# Patient Record
Sex: FEMALE | Race: OTHER | HISPANIC OR LATINO | URBAN - METROPOLITAN AREA
[De-identification: names, ages, dates, MRNs, and addresses within clinical notes are randomized per-mention and may not be internally consistent; named-entity substitution may affect disease eponyms.]

---

## 2023-09-26 ENCOUNTER — INPATIENT (INPATIENT)
Facility: HOSPITAL | Age: 88
LOS: 1 days | Discharge: HOME | DRG: 391 | End: 2023-09-28
Attending: INTERNAL MEDICINE | Admitting: INTERNAL MEDICINE
Payer: COMMERCIAL

## 2023-09-26 VITALS
RESPIRATION RATE: 17 BRPM | WEIGHT: 160.06 LBS | SYSTOLIC BLOOD PRESSURE: 82 MMHG | HEART RATE: 93 BPM | TEMPERATURE: 97 F | HEIGHT: 62 IN | OXYGEN SATURATION: 98 % | DIASTOLIC BLOOD PRESSURE: 52 MMHG

## 2023-09-26 LAB
ALBUMIN SERPL ELPH-MCNC: 3.5 G/DL — SIGNIFICANT CHANGE UP (ref 3.3–5)
ALP SERPL-CCNC: 116 U/L — SIGNIFICANT CHANGE UP (ref 40–120)
ALT FLD-CCNC: 39 U/L — SIGNIFICANT CHANGE UP (ref 12–78)
ANION GAP SERPL CALC-SCNC: 10 MMOL/L — SIGNIFICANT CHANGE UP (ref 5–17)
AST SERPL-CCNC: 16 U/L — SIGNIFICANT CHANGE UP (ref 15–37)
BASOPHILS # BLD AUTO: 0.07 K/UL — SIGNIFICANT CHANGE UP (ref 0–0.2)
BASOPHILS NFR BLD AUTO: 0.6 % — SIGNIFICANT CHANGE UP (ref 0–2)
BILIRUB SERPL-MCNC: 0.3 MG/DL — SIGNIFICANT CHANGE UP (ref 0.2–1.2)
BUN SERPL-MCNC: 33 MG/DL — HIGH (ref 7–23)
CALCIUM SERPL-MCNC: 9.3 MG/DL — SIGNIFICANT CHANGE UP (ref 8.5–10.1)
CHLORIDE SERPL-SCNC: 97 MMOL/L — SIGNIFICANT CHANGE UP (ref 96–108)
CO2 SERPL-SCNC: 25 MMOL/L — SIGNIFICANT CHANGE UP (ref 22–31)
CREAT SERPL-MCNC: 2.7 MG/DL — HIGH (ref 0.5–1.3)
EGFR: 17 ML/MIN/1.73M2 — LOW
EOSINOPHIL # BLD AUTO: 0.06 K/UL — SIGNIFICANT CHANGE UP (ref 0–0.5)
EOSINOPHIL NFR BLD AUTO: 0.5 % — SIGNIFICANT CHANGE UP (ref 0–6)
GLUCOSE SERPL-MCNC: 428 MG/DL — HIGH (ref 70–99)
HCT VFR BLD CALC: 38.7 % — SIGNIFICANT CHANGE UP (ref 34.5–45)
HGB BLD-MCNC: 13 G/DL — SIGNIFICANT CHANGE UP (ref 11.5–15.5)
IMM GRANULOCYTES NFR BLD AUTO: 0.8 % — SIGNIFICANT CHANGE UP (ref 0–0.9)
LIDOCAIN IGE QN: 57 U/L — SIGNIFICANT CHANGE UP (ref 13–75)
LYMPHOCYTES # BLD AUTO: 0.82 K/UL — LOW (ref 1–3.3)
LYMPHOCYTES # BLD AUTO: 7.2 % — LOW (ref 13–44)
MCHC RBC-ENTMCNC: 29.7 PG — SIGNIFICANT CHANGE UP (ref 27–34)
MCHC RBC-ENTMCNC: 33.6 GM/DL — SIGNIFICANT CHANGE UP (ref 32–36)
MCV RBC AUTO: 88.4 FL — SIGNIFICANT CHANGE UP (ref 80–100)
MONOCYTES # BLD AUTO: 0.56 K/UL — SIGNIFICANT CHANGE UP (ref 0–0.9)
MONOCYTES NFR BLD AUTO: 4.9 % — SIGNIFICANT CHANGE UP (ref 2–14)
NEUTROPHILS # BLD AUTO: 9.85 K/UL — HIGH (ref 1.8–7.4)
NEUTROPHILS NFR BLD AUTO: 86 % — HIGH (ref 43–77)
NRBC # BLD: 0 /100 WBCS — SIGNIFICANT CHANGE UP (ref 0–0)
PLATELET # BLD AUTO: 284 K/UL — SIGNIFICANT CHANGE UP (ref 150–400)
POTASSIUM SERPL-MCNC: 5.4 MMOL/L — HIGH (ref 3.5–5.3)
POTASSIUM SERPL-SCNC: 5.4 MMOL/L — HIGH (ref 3.5–5.3)
PROT SERPL-MCNC: 7.5 G/DL — SIGNIFICANT CHANGE UP (ref 6–8.3)
RBC # BLD: 4.38 M/UL — SIGNIFICANT CHANGE UP (ref 3.8–5.2)
RBC # FLD: 12.9 % — SIGNIFICANT CHANGE UP (ref 10.3–14.5)
SODIUM SERPL-SCNC: 132 MMOL/L — LOW (ref 135–145)
WBC # BLD: 11.45 K/UL — HIGH (ref 3.8–10.5)
WBC # FLD AUTO: 11.45 K/UL — HIGH (ref 3.8–10.5)

## 2023-09-26 PROCEDURE — 99285 EMERGENCY DEPT VISIT HI MDM: CPT

## 2023-09-26 RX ORDER — ONDANSETRON 8 MG/1
4 TABLET, FILM COATED ORAL ONCE
Refills: 0 | Status: COMPLETED | OUTPATIENT
Start: 2023-09-26 | End: 2023-09-26

## 2023-09-26 RX ORDER — MORPHINE SULFATE 50 MG/1
4 CAPSULE, EXTENDED RELEASE ORAL ONCE
Refills: 0 | Status: DISCONTINUED | OUTPATIENT
Start: 2023-09-26 | End: 2023-09-26

## 2023-09-26 RX ORDER — SODIUM CHLORIDE 9 MG/ML
1000 INJECTION INTRAMUSCULAR; INTRAVENOUS; SUBCUTANEOUS ONCE
Refills: 0 | Status: COMPLETED | OUTPATIENT
Start: 2023-09-26 | End: 2023-09-26

## 2023-09-26 RX ADMIN — SODIUM CHLORIDE 1000 MILLILITER(S): 9 INJECTION INTRAMUSCULAR; INTRAVENOUS; SUBCUTANEOUS at 23:36

## 2023-09-26 RX ADMIN — MORPHINE SULFATE 4 MILLIGRAM(S): 50 CAPSULE, EXTENDED RELEASE ORAL at 23:36

## 2023-09-26 RX ADMIN — ONDANSETRON 4 MILLIGRAM(S): 8 TABLET, FILM COATED ORAL at 23:36

## 2023-09-26 NOTE — ED ADULT NURSE NOTE - CHPI ED NUR SYMPTOMS NEG
no abdominal distension/no blood in stool/no burning urination/no chills/no diarrhea/no dysuria/no fever/no hematuria Topical Ketoconazole Counseling: Patient counseled that this medication may cause skin irritation or allergic reactions.  In the event of skin irritation, the patient was advised to reduce the amount of the drug applied or use it less frequently.   The patient verbalized understanding of the proper use and possible adverse effects of ketoconazole.  All of the patient's questions and concerns were addressed.

## 2023-09-26 NOTE — ED ADULT NURSE NOTE - OBJECTIVE STATEMENT
Son stated she began having abdomen pain 8/10 generalized  with yellow vomitus that started yesterday. No diarrhea, LBM was 2 hours prior to arrival. Voiding ok. Had similar pain in past with abdominal surgery, but she is unable to recall what type of surgery. Denies fever, or chills. Complaining of dizziness with mobility.

## 2023-09-26 NOTE — ED ADULT NURSE NOTE - NSFALLUNIVINTERV_ED_ALL_ED
Bed/Stretcher in lowest position, wheels locked, appropriate side rails in place/Call bell, personal items and telephone in reach/Instruct patient to call for assistance before getting out of bed/chair/stretcher/Non-slip footwear applied when patient is off stretcher/[place] to call system/Physically safe environment - no spills, clutter or unnecessary equipment/Purposeful proactive rounding/Room/bathroom lighting operational, light cord in reach

## 2023-09-26 NOTE — ED ADULT TRIAGE NOTE - CHIEF COMPLAINT QUOTE
generalized abdominal pain with nausea and vomiting since yesterday. weakness and dizzy after vomiting yesterday

## 2023-09-27 DIAGNOSIS — N17.9 ACUTE KIDNEY FAILURE, UNSPECIFIED: ICD-10-CM

## 2023-09-27 DIAGNOSIS — I10 ESSENTIAL (PRIMARY) HYPERTENSION: ICD-10-CM

## 2023-09-27 DIAGNOSIS — R74.01 ELEVATION OF LEVELS OF LIVER TRANSAMINASE LEVELS: ICD-10-CM

## 2023-09-27 DIAGNOSIS — R10.9 UNSPECIFIED ABDOMINAL PAIN: ICD-10-CM

## 2023-09-27 DIAGNOSIS — E11.9 TYPE 2 DIABETES MELLITUS WITHOUT COMPLICATIONS: ICD-10-CM

## 2023-09-27 DIAGNOSIS — K52.9 NONINFECTIVE GASTROENTERITIS AND COLITIS, UNSPECIFIED: ICD-10-CM

## 2023-09-27 DIAGNOSIS — Z90.49 ACQUIRED ABSENCE OF OTHER SPECIFIED PARTS OF DIGESTIVE TRACT: Chronic | ICD-10-CM

## 2023-09-27 LAB
A1C WITH ESTIMATED AVERAGE GLUCOSE RESULT: 10.8 % — HIGH (ref 4–5.6)
ALBUMIN SERPL ELPH-MCNC: 2.9 G/DL — LOW (ref 3.3–5)
ALP SERPL-CCNC: 123 U/L — HIGH (ref 40–120)
ALT FLD-CCNC: 210 U/L — HIGH (ref 12–78)
ANION GAP SERPL CALC-SCNC: 10 MMOL/L — SIGNIFICANT CHANGE UP (ref 5–17)
APTT BLD: 19.6 SEC — LOW (ref 24.5–35.6)
AST SERPL-CCNC: 476 U/L — HIGH (ref 15–37)
BILIRUB SERPL-MCNC: 0.5 MG/DL — SIGNIFICANT CHANGE UP (ref 0.2–1.2)
BUN SERPL-MCNC: 29 MG/DL — HIGH (ref 7–23)
CALCIUM SERPL-MCNC: 8.1 MG/DL — LOW (ref 8.5–10.1)
CHLORIDE SERPL-SCNC: 105 MMOL/L — SIGNIFICANT CHANGE UP (ref 96–108)
CO2 SERPL-SCNC: 21 MMOL/L — LOW (ref 22–31)
CREAT SERPL-MCNC: 1.9 MG/DL — HIGH (ref 0.5–1.3)
EGFR: 25 ML/MIN/1.73M2 — LOW
ESTIMATED AVERAGE GLUCOSE: 263 MG/DL — HIGH (ref 68–114)
GLUCOSE BLDC GLUCOMTR-MCNC: 149 MG/DL — HIGH (ref 70–99)
GLUCOSE BLDC GLUCOMTR-MCNC: 205 MG/DL — HIGH (ref 70–99)
GLUCOSE BLDC GLUCOMTR-MCNC: 214 MG/DL — HIGH (ref 70–99)
GLUCOSE BLDC GLUCOMTR-MCNC: 218 MG/DL — HIGH (ref 70–99)
GLUCOSE SERPL-MCNC: 260 MG/DL — HIGH (ref 70–99)
HCT VFR BLD CALC: 31 % — LOW (ref 34.5–45)
HGB BLD-MCNC: 10.2 G/DL — LOW (ref 11.5–15.5)
INR BLD: 0.89 RATIO — SIGNIFICANT CHANGE UP (ref 0.85–1.18)
LACTATE SERPL-SCNC: 2.4 MMOL/L — HIGH (ref 0.7–2)
LACTATE SERPL-SCNC: 2.7 MMOL/L — HIGH (ref 0.7–2)
LACTATE SERPL-SCNC: 3.1 MMOL/L — HIGH (ref 0.7–2)
LACTATE SERPL-SCNC: 3.6 MMOL/L — HIGH (ref 0.7–2)
MAGNESIUM SERPL-MCNC: 2.2 MG/DL — SIGNIFICANT CHANGE UP (ref 1.6–2.6)
MCHC RBC-ENTMCNC: 29.6 PG — SIGNIFICANT CHANGE UP (ref 27–34)
MCHC RBC-ENTMCNC: 32.9 GM/DL — SIGNIFICANT CHANGE UP (ref 32–36)
MCV RBC AUTO: 89.9 FL — SIGNIFICANT CHANGE UP (ref 80–100)
NRBC # BLD: 0 /100 WBCS — SIGNIFICANT CHANGE UP (ref 0–0)
PLATELET # BLD AUTO: 234 K/UL — SIGNIFICANT CHANGE UP (ref 150–400)
POTASSIUM SERPL-MCNC: 4.5 MMOL/L — SIGNIFICANT CHANGE UP (ref 3.5–5.3)
POTASSIUM SERPL-SCNC: 4.5 MMOL/L — SIGNIFICANT CHANGE UP (ref 3.5–5.3)
PROT SERPL-MCNC: 6.7 G/DL — SIGNIFICANT CHANGE UP (ref 6–8.3)
PROTHROM AB SERPL-ACNC: 10.4 SEC — SIGNIFICANT CHANGE UP (ref 9.5–13)
RBC # BLD: 3.45 M/UL — LOW (ref 3.8–5.2)
RBC # FLD: 13 % — SIGNIFICANT CHANGE UP (ref 10.3–14.5)
SARS-COV-2 RNA SPEC QL NAA+PROBE: SIGNIFICANT CHANGE UP
SODIUM SERPL-SCNC: 136 MMOL/L — SIGNIFICANT CHANGE UP (ref 135–145)
WBC # BLD: 6.97 K/UL — SIGNIFICANT CHANGE UP (ref 3.8–10.5)
WBC # FLD AUTO: 6.97 K/UL — SIGNIFICANT CHANGE UP (ref 3.8–10.5)

## 2023-09-27 PROCEDURE — 93010 ELECTROCARDIOGRAM REPORT: CPT

## 2023-09-27 PROCEDURE — 74176 CT ABD & PELVIS W/O CONTRAST: CPT | Mod: 26

## 2023-09-27 PROCEDURE — 99223 1ST HOSP IP/OBS HIGH 75: CPT

## 2023-09-27 RX ORDER — DEXTROSE 50 % IN WATER 50 %
15 SYRINGE (ML) INTRAVENOUS ONCE
Refills: 0 | Status: DISCONTINUED | OUTPATIENT
Start: 2023-09-27 | End: 2023-09-28

## 2023-09-27 RX ORDER — ONDANSETRON 8 MG/1
4 TABLET, FILM COATED ORAL EVERY 6 HOURS
Refills: 0 | Status: DISCONTINUED | OUTPATIENT
Start: 2023-09-27 | End: 2023-09-28

## 2023-09-27 RX ORDER — SODIUM CHLORIDE 9 MG/ML
1000 INJECTION, SOLUTION INTRAVENOUS
Refills: 0 | Status: DISCONTINUED | OUTPATIENT
Start: 2023-09-27 | End: 2023-09-28

## 2023-09-27 RX ORDER — INSULIN LISPRO 100/ML
VIAL (ML) SUBCUTANEOUS AT BEDTIME
Refills: 0 | Status: DISCONTINUED | OUTPATIENT
Start: 2023-09-27 | End: 2023-09-27

## 2023-09-27 RX ORDER — DEXTROSE 50 % IN WATER 50 %
25 SYRINGE (ML) INTRAVENOUS ONCE
Refills: 0 | Status: DISCONTINUED | OUTPATIENT
Start: 2023-09-27 | End: 2023-09-28

## 2023-09-27 RX ORDER — HEPARIN SODIUM 5000 [USP'U]/ML
5000 INJECTION INTRAVENOUS; SUBCUTANEOUS EVERY 12 HOURS
Refills: 0 | Status: DISCONTINUED | OUTPATIENT
Start: 2023-09-27 | End: 2023-09-28

## 2023-09-27 RX ORDER — SODIUM CHLORIDE 9 MG/ML
1000 INJECTION INTRAMUSCULAR; INTRAVENOUS; SUBCUTANEOUS ONCE
Refills: 0 | Status: COMPLETED | OUTPATIENT
Start: 2023-09-27 | End: 2023-09-27

## 2023-09-27 RX ORDER — PIPERACILLIN AND TAZOBACTAM 4; .5 G/20ML; G/20ML
3.38 INJECTION, POWDER, LYOPHILIZED, FOR SOLUTION INTRAVENOUS ONCE
Refills: 0 | Status: COMPLETED | OUTPATIENT
Start: 2023-09-27 | End: 2023-09-27

## 2023-09-27 RX ORDER — INFLUENZA VIRUS VACCINE 15; 15; 15; 15 UG/.5ML; UG/.5ML; UG/.5ML; UG/.5ML
0.7 SUSPENSION INTRAMUSCULAR ONCE
Refills: 0 | Status: DISCONTINUED | OUTPATIENT
Start: 2023-09-27 | End: 2023-09-28

## 2023-09-27 RX ORDER — INSULIN LISPRO 100/ML
VIAL (ML) SUBCUTANEOUS
Refills: 0 | Status: DISCONTINUED | OUTPATIENT
Start: 2023-09-27 | End: 2023-09-28

## 2023-09-27 RX ORDER — INSULIN LISPRO 100/ML
VIAL (ML) SUBCUTANEOUS
Refills: 0 | Status: DISCONTINUED | OUTPATIENT
Start: 2023-09-27 | End: 2023-09-27

## 2023-09-27 RX ORDER — LANOLIN ALCOHOL/MO/W.PET/CERES
3 CREAM (GRAM) TOPICAL AT BEDTIME
Refills: 0 | Status: DISCONTINUED | OUTPATIENT
Start: 2023-09-27 | End: 2023-09-28

## 2023-09-27 RX ORDER — SODIUM CHLORIDE 9 MG/ML
1000 INJECTION INTRAMUSCULAR; INTRAVENOUS; SUBCUTANEOUS
Refills: 0 | Status: DISCONTINUED | OUTPATIENT
Start: 2023-09-27 | End: 2023-09-28

## 2023-09-27 RX ORDER — GLUCAGON INJECTION, SOLUTION 0.5 MG/.1ML
1 INJECTION, SOLUTION SUBCUTANEOUS ONCE
Refills: 0 | Status: DISCONTINUED | OUTPATIENT
Start: 2023-09-27 | End: 2023-09-28

## 2023-09-27 RX ORDER — DEXTROSE 50 % IN WATER 50 %
12.5 SYRINGE (ML) INTRAVENOUS ONCE
Refills: 0 | Status: DISCONTINUED | OUTPATIENT
Start: 2023-09-27 | End: 2023-09-28

## 2023-09-27 RX ORDER — INSULIN LISPRO 100/ML
VIAL (ML) SUBCUTANEOUS AT BEDTIME
Refills: 0 | Status: DISCONTINUED | OUTPATIENT
Start: 2023-09-27 | End: 2023-09-28

## 2023-09-27 RX ORDER — ACETAMINOPHEN 500 MG
650 TABLET ORAL EVERY 6 HOURS
Refills: 0 | Status: DISCONTINUED | OUTPATIENT
Start: 2023-09-27 | End: 2023-09-28

## 2023-09-27 RX ADMIN — SODIUM CHLORIDE 1000 MILLILITER(S): 9 INJECTION INTRAMUSCULAR; INTRAVENOUS; SUBCUTANEOUS at 01:12

## 2023-09-27 RX ADMIN — HEPARIN SODIUM 5000 UNIT(S): 5000 INJECTION INTRAVENOUS; SUBCUTANEOUS at 17:26

## 2023-09-27 RX ADMIN — Medication 2: at 11:46

## 2023-09-27 RX ADMIN — Medication 2: at 07:54

## 2023-09-27 RX ADMIN — PIPERACILLIN AND TAZOBACTAM 200 GRAM(S): 4; .5 INJECTION, POWDER, LYOPHILIZED, FOR SOLUTION INTRAVENOUS at 01:11

## 2023-09-27 RX ADMIN — HEPARIN SODIUM 5000 UNIT(S): 5000 INJECTION INTRAVENOUS; SUBCUTANEOUS at 06:23

## 2023-09-27 RX ADMIN — SODIUM CHLORIDE 500 MILLILITER(S): 9 INJECTION INTRAMUSCULAR; INTRAVENOUS; SUBCUTANEOUS at 10:59

## 2023-09-27 RX ADMIN — SODIUM CHLORIDE 100 MILLILITER(S): 9 INJECTION INTRAMUSCULAR; INTRAVENOUS; SUBCUTANEOUS at 05:46

## 2023-09-27 NOTE — ED PROVIDER NOTE - GASTROINTESTINAL, MLM
Abdomen diffusely distended, with diffuse, non-focal tenderness.  No rebound, no guarding.  +BS, no CVA tenderness, no pulsatile mass

## 2023-09-27 NOTE — ED PROVIDER NOTE - OBJECTIVE STATEMENT
87-year-old female with a history of HLD, DM presents with abdominal pain.  Macedonian translation provided by son at the bedside.  Son reports that patient had mild generalized abdominal pain yesterday.  Today the pain significantly worsened, and is associated with 5 episodes of nonbloody vomitus.  Patient took no pain medication prior to arrival.  She reports severe constant abdominal pain that occasionally radiates to her back.  No associated diarrhea, fever, dysuria, hematuria.  History of cholecystectomy 5 years ago.  No PMD. 87-year-old female with a history of HLD, DM presents with abdominal pain.  Lao translation provided by son at the bedside.  Son reports that patient had mild generalized abdominal pain yesterday.  Today the pain significantly worsened, and is associated with 5 episodes of nonbloody vomitus.  Patient took no pain medication prior to arrival.  She reports severe constant abdominal pain that occasionally radiates to her back.  No associated diarrhea, fever, dysuria, hematuria.  History of cholecystectomy 15 years ago.  No PMD. 87-year-old female with a history of HLD, DM presents with abdominal pain.  Slovak translation provided by son at the bedside.  Son reports that patient had mild generalized abdominal pain yesterday.  Today the pain significantly worsened, and is associated with 5 episodes of nonbloody vomitus.  Patient took no pain medication prior to arrival.  She reports severe constant abdominal pain that occasionally radiates to her back.  No associated diarrhea, fever, dysuria, hematuria.  History of cholecystectomy 15 years ago.  Unknown baseline Cr. No PMD. 87-year-old female with a history of HLD, DM presents with abdominal pain.  Armenian translation provided by son at the bedside.  Son reports that patient had mild generalized abdominal pain yesterday.  Today the pain significantly worsened, and is associated with 5 episodes of nonbloody vomitus.  Patient took no pain medication prior to arrival.  She reports severe constant abdominal pain that occasionally radiates to her back.  No associated diarrhea, fever, dysuria, hematuria.  History of cholecystectomy 15 years ago.  Unknown baseline Cr. Son does not know what medication patient takes for her DM or HLD. No PMD.

## 2023-09-27 NOTE — CONSULT NOTE ADULT - SUBJECTIVE AND OBJECTIVE BOX
Patient is a 87y old  Female who presents with a chief complaint of n/v (27 Sep 2023 09:31)      Reason For Consult: dm2 uncontrolled    HPI:  86 y/o female w/ PMH DM p/w abdominal pain x2 days. History partially gathered from pt's grand daughter Afsaneh (Emergency contact) on the phone. Pt states abdominal pain started 2 days ago and was mild at outset. Pt's granddaughter notes that this occasional happens in the past and usually subsides on its own. Pt then endorses increased pain today and vomiting on herself (NBNB emesis) at least five times. Pt endorses pain was severe when she came to the ER and has improved s/p pain medication administration (~3 hours ago). At present, pt now endorses generalized body pain and headache, but less abdominal pain. No diarrhea.  Pt's granddaughter states that appointment to establish care w/ a PCP was made when pt's symptoms first started (Dr. Nadia Venegas) and was supposed to be later today. Pt is originally from [facility] and has been living with her son for the last few months. Patient hypotensive in ER - SBP 80s   (27 Sep 2023 06:06)      PAST MEDICAL & SURGICAL HISTORY:  DM (diabetes mellitus)      HLD (hyperlipidemia)      History of cholecystectomy          FAMILY HISTORY:        Social History:    MEDICATIONS  (STANDING):  dextrose 5%. 1000 milliLiter(s) (50 mL/Hr) IV Continuous <Continuous>  dextrose 5%. 1000 milliLiter(s) (100 mL/Hr) IV Continuous <Continuous>  dextrose 50% Injectable 25 Gram(s) IV Push once  dextrose 50% Injectable 25 Gram(s) IV Push once  dextrose 50% Injectable 12.5 Gram(s) IV Push once  glucagon  Injectable 1 milliGRAM(s) IntraMuscular once  heparin   Injectable 5000 Unit(s) SubCutaneous every 12 hours  insulin lispro (ADMELOG) corrective regimen sliding scale   SubCutaneous three times a day before meals  insulin lispro (ADMELOG) corrective regimen sliding scale   SubCutaneous at bedtime  sodium chloride 0.9%. 1000 milliLiter(s) (100 mL/Hr) IV Continuous <Continuous>    MEDICATIONS  (PRN):  acetaminophen     Tablet .. 650 milliGRAM(s) Oral every 6 hours PRN Temp greater or equal to 38C (100.4F), Mild Pain (1 - 3)  aluminum hydroxide/magnesium hydroxide/simethicone Suspension 30 milliLiter(s) Oral every 4 hours PRN Dyspepsia  dextrose Oral Gel 15 Gram(s) Oral once PRN Blood Glucose LESS THAN 70 milliGRAM(s)/deciliter  melatonin 3 milliGRAM(s) Oral at bedtime PRN Insomnia  ondansetron Injectable 4 milliGRAM(s) IV Push every 6 hours PRN Nausea and/or Vomiting        T(C): 36.9 (09-27-23 @ 06:44), Max: 36.9 (09-27-23 @ 06:44)  HR: 79 (09-27-23 @ 06:44) (79 - 93)  BP: 123/68 (09-27-23 @ 06:44) (82/52 - 123/68)  RR: 17 (09-27-23 @ 06:44) (16 - 17)  SpO2: 93% (09-27-23 @ 06:44) (93% - 98%)  Wt(kg): --    PHYSICAL EXAM:  CHEST/LUNG: Clear to percussion bilaterally; No rales, rhonchi, wheezing, or rubs  HEART: Regular rate and rhythm; No murmurs, rubs, or gallops  ABDOMEN: Soft, Nontender, Nondistended; Bowel sounds present  EXTREMITIES:  2+ Peripheral Pulses, No clubbing, cyanosis, or edema  SKIN: No rashes or lesions    CAPILLARY BLOOD GLUCOSE      POCT Blood Glucose.: 218 mg/dL (27 Sep 2023 07:50)                            10.2   6.97  )-----------( 234      ( 27 Sep 2023 09:35 )             31.0       CMP:  09-27 @ 09:35  SGPT 210  Albumin 2.9   Alk Phos 123   Anion Gap 10   SGOT 476   Total Bili 0.5   BUN 29   Calcium Total 8.1   CO2 21   Chloride 105   Creatinine 1.90   eGFR if AA --   eGFR if non AA --   Glucose 260   Potassium 4.5   Protein 6.7   Sodium 136      Thyroid Function Tests:      Diabetes Tests:       Radiology:

## 2023-09-27 NOTE — CONSULT NOTE ADULT - SUBJECTIVE AND OBJECTIVE BOX
[facility]  Adi Pino PA-C  [address] [place] [address] [id number] [phone]      Chief Complaint:  Patient is a 87y old  Female who presents with a chief complaint of n/v (27 Sep 2023 11:37)      HPI:86 y/o female w/ PMH DM p/w abdominal pain x2 days. History partially gathered from pt's grand daughter Afsaneh (Emergency contact) on the phone. Pt states abdominal pain started 2 days ago and was mild at outset. Pt's granddaughter notes that this occasional happens in the past and usually subsides on its own. Pt then endorses increased pain today and vomiting on herself (NBNB emesis) at least five times. Pt endorses pain was severe when she came to the ER and has improved s/p pain medication administration (~3 hours ago). At present, pt now endorses generalized body pain and headache, but less abdominal pain. No diarrhea.      Allergies:  No Known Allergies      Medications:  acetaminophen     Tablet .. 650 milliGRAM(s) Oral every 6 hours PRN  aluminum hydroxide/magnesium hydroxide/simethicone Suspension 30 milliLiter(s) Oral every 4 hours PRN  dextrose 5%. 1000 milliLiter(s) IV Continuous <Continuous>  dextrose 5%. 1000 milliLiter(s) IV Continuous <Continuous>  dextrose 50% Injectable 25 Gram(s) IV Push once  dextrose 50% Injectable 25 Gram(s) IV Push once  dextrose 50% Injectable 12.5 Gram(s) IV Push once  dextrose Oral Gel 15 Gram(s) Oral once PRN  glucagon  Injectable 1 milliGRAM(s) IntraMuscular once  heparin   Injectable 5000 Unit(s) SubCutaneous every 12 hours  influenza  Vaccine (HIGH DOSE) 0.7 milliLiter(s) IntraMuscular once  insulin lispro (ADMELOG) corrective regimen sliding scale   SubCutaneous at bedtime  insulin lispro (ADMELOG) corrective regimen sliding scale   SubCutaneous three times a day before meals  melatonin 3 milliGRAM(s) Oral at bedtime PRN  ondansetron Injectable 4 milliGRAM(s) IV Push every 6 hours PRN  sodium chloride 0.9%. 1000 milliLiter(s) IV Continuous <Continuous>      PMHX/PSHX:  DM (diabetes mellitus)    HLD (hyperlipidemia)    History of cholecystectomy        Family history:      Social History:     ROS:     General:  no fevers, chills, night sweats, fatigue,   Eyes:  Good vision, no reported pain  ENT:  No sore throat, pain, runny nose, dysphagia  CV:  No pain, palpitations, hypo/hypertension  Resp:  No dyspnea, cough, tachypnea, wheezing  GI:  + pain, No nausea, No vomiting, No diarrhea, No constipation, No weight loss, No fever, No pruritis, No rectal bleeding, No tarry stools, No dysphagia,  :  No pain, bleeding, incontinence, nocturia  Muscle:  No pain, weakness  Neuro:  No weakness, tingling, memory problems  Psych:  No fatigue, insomnia, mood problems, depression  Endocrine:  No polyuria, polydipsia, cold/heat intolerance  Heme:  No petechiae, ecchymosis, easy bruisability  Skin:  No rash, tattoos, scars, edema      PHYSICAL EXAM:   Vital Signs:  Vital Signs Last 24 Hrs  T(C): 36.8 (27 Sep 2023 11:30), Max: 36.9 (27 Sep 2023 06:44)  T(F): 98.3 (27 Sep 2023 11:30), Max: 98.4 (27 Sep 2023 06:44)  HR: 70 (27 Sep 2023 11:30) (70 - 93)  BP: 110/67 (27 Sep 2023 11:30) (82/52 - 123/68)  BP(mean): --  RR: 19 (27 Sep 2023 11:30) (16 - 19)  SpO2: 98% (27 Sep 2023 11:30) (93% - 98%)    Parameters below as of 27 Sep 2023 11:30  Patient On (Oxygen Delivery Method): room air      Daily Height in cm: 157.48 (26 Sep 2023 21:02)    Daily     GENERAL:  Appears stated age,   HEENT:  NC/AT,    CHEST:  Full & symmetric excursion,   HEART:  Regular rhythm  ABDOMEN:  Soft, non-tender, non-distended,   EXTEREMITIES:  no cyanosis,clubbing or edema  SKIN:  No rash  NEURO:  Alert,    LABS:                        10.2   6.97  )-----------( 234      ( 27 Sep 2023 09:35 )             31.0     09-27    136  |  105  |  29<H>  ----------------------------<  260<H>  4.5   |  21<L>  |  1.90<H>    Ca    8.1<L>      27 Sep 2023 09:35  Mg     2.2     09-27    TPro  6.7  /  Alb  2.9<L>  /  TBili  0.5  /  DBili  x   /  AST  476<H>  /  ALT  210<H>  /  AlkPhos  123<H>  09-27    LIVER FUNCTIONS - ( 27 Sep 2023 09:35 )  Alb: 2.9 g/dL / Pro: 6.7 g/dL / ALK PHOS: 123 U/L / ALT: 210 U/L / AST: 476 U/L / GGT: x           PT/INR - ( 26 Sep 2023 23:26 )   PT: 10.4 sec;   INR: 0.89 ratio         PTT - ( 26 Sep 2023 23:26 )  PTT:19.6 sec  Urinalysis Basic - ( 27 Sep 2023 09:35 )    Color: x / Appearance: x / SG: x / pH: x  Gluc: 260 mg/dL / Ketone: x  / Bili: x / Urobili: x   Blood: x / Protein: x / Nitrite: x   Leuk Esterase: x / RBC: x / WBC x   Sq Epi: x / Non Sq Epi: x / Bacteria: x      Amylase Serum--      Lipase serum57       Ammonia--      Imaging:

## 2023-09-27 NOTE — PATIENT PROFILE ADULT - FALL HARM RISK - HARM RISK INTERVENTIONS
Assistance with ambulation/Assistance OOB with selected safe patient handling equipment/Communicate Risk of Fall with Harm to all staff/Discuss with provider need for PT consult/Monitor gait and stability/Provide patient with walking aids - walker, cane, crutches/Reinforce activity limits and safety measures with patient and family/Sit up slowly, dangle for a short time, stand at bedside before walking/Tailored Fall Risk Interventions/Visual Cue: Yellow wristband and red socks/Bed in lowest position, wheels locked, appropriate side rails in place/Call bell, personal items and telephone in reach/Instruct patient to call for assistance before getting out of bed or chair/Non-slip footwear when patient is out of bed/[place] to call system/Physically safe environment - no spills, clutter or unnecessary equipment/Purposeful Proactive Rounding/Room/bathroom lighting operational, light cord in reach

## 2023-09-27 NOTE — H&P ADULT - PROBLEM SELECTOR PLAN 1
Admit  Clear liquid diet, advance as tolerated  Zofran prn  IV PPI qd  Trend labs  Surgery f/u  GI consult  Further work-up/management pending clinical course. Admit  Clear liquid diet, advance as tolerated  Stool cultures if patient develops diarrhea  Zofran prn  IV PPI qd  Trend labs  Surgery f/u  GI consult  Further work-up/management pending clinical course.

## 2023-09-27 NOTE — CONSULT NOTE ADULT - SUBJECTIVE AND OBJECTIVE BOX
Patient is a 87y old  Female who presents with a chief complaint of n/v (27 Sep 2023 06:06)    HPI:  88 y/o female w/ PMH DM p/w abdominal pain x2 days. History partially gathered from pt's grand daughter Afsaneh (Emergency contact) on the phone. Pt states abdominal pain started 2 days ago and was mild at outset. Pt's granddaughter notes that this occasional happens in the past and usually subsides on its own. Pt then endorses increased pain today and vomiting on herself (NBNB emesis) at least five times. Pt endorses pain was severe when she came to the ER and has improved s/p pain medication administration (~3 hours ago). At present, pt now endorses generalized body pain and headache, but less abdominal pain.   Pt's granddaughter states that appointment to establish care w/ a PCP was made when pt's symptoms first started (Dr. Nadia Venegas) and was supposed to be later today. Pt is originally from [facility] and has been living with her son for the last few months.   (27 Sep 2023 06:06)      PAST MEDICAL HISTORY:  DM (diabetes mellitus)    HLD (hyperlipidemia)        PAST SURGICAL HISTORY:  History of cholecystectomy        FAMILY HISTORY:      SOCIAL HISTORY:    Allergies    No Known Allergies    Intolerances      Home Medications:    MEDICATIONS  (STANDING):  dextrose 5%. 1000 milliLiter(s) (50 mL/Hr) IV Continuous <Continuous>  dextrose 5%. 1000 milliLiter(s) (100 mL/Hr) IV Continuous <Continuous>  dextrose 50% Injectable 25 Gram(s) IV Push once  dextrose 50% Injectable 25 Gram(s) IV Push once  dextrose 50% Injectable 12.5 Gram(s) IV Push once  glucagon  Injectable 1 milliGRAM(s) IntraMuscular once  heparin   Injectable 5000 Unit(s) SubCutaneous every 12 hours  insulin lispro (ADMELOG) corrective regimen sliding scale   SubCutaneous three times a day before meals  insulin lispro (ADMELOG) corrective regimen sliding scale   SubCutaneous at bedtime  sodium chloride 0.9%. 1000 milliLiter(s) (100 mL/Hr) IV Continuous <Continuous>    MEDICATIONS  (PRN):  acetaminophen     Tablet .. 650 milliGRAM(s) Oral every 6 hours PRN Temp greater or equal to 38C (100.4F), Mild Pain (1 - 3)  aluminum hydroxide/magnesium hydroxide/simethicone Suspension 30 milliLiter(s) Oral every 4 hours PRN Dyspepsia  dextrose Oral Gel 15 Gram(s) Oral once PRN Blood Glucose LESS THAN 70 milliGRAM(s)/deciliter  melatonin 3 milliGRAM(s) Oral at bedtime PRN Insomnia  ondansetron Injectable 4 milliGRAM(s) IV Push every 6 hours PRN Nausea and/or Vomiting      REVIEW OF SYSTEMS:  General:   Respiratory: No cough, SOB  Cardiovascular: No CP or Palpitations	  Gastrointestinal: No nausea, Vomiting. No diarrhea  Genitourinary: No urinary complaints	  Musculoskeletal: No leg swelling, No new rash or lesions	  Neurological: 	  all other systems negative    T(F): 98.4 (09-27-23 @ 06:44), Max: 98.4 (09-27-23 @ 06:44)  HR: 79 (09-27-23 @ 06:44) (79 - 93)  BP: 123/68 (09-27-23 @ 06:44) (82/52 - 123/68)  RR: 17 (09-27-23 @ 06:44) (16 - 17)  SpO2: 93% (09-27-23 @ 06:44) (93% - 98%)  Wt(kg): --    PHYSICAL EXAM:  General: NAD  Respiratory: b/l air entry  Cardiovascular: S1 S2  Gastrointestinal: soft  Extremities: edema        09-26    132<L>  |  97  |  33<H>  ----------------------------<  428<H>  5.4<H>   |  25  |  2.70<H>    Ca    9.3      26 Sep 2023 23:26    TPro  7.5  /  Alb  3.5  /  TBili  0.3  /  DBili  x   /  AST  16  /  ALT  39  /  AlkPhos  116  09-26                          13.0   11.45 )-----------( 284      ( 26 Sep 2023 23:26 )             38.7       Calcium: 9.3 mg/dL (09-26 @ 23:26)  Blood Urea Nitrogen: 33 mg/dL (09-26 @ 23:26)  Potassium: 5.4 mmol/L (09-26 @ 23:26)  Hemoglobin: 13.0 g/dL (09-26 @ 23:26)      Creatinine, Serum: 2.70 (09-26 @ 23:26)      Urinalysis Basic - ( 26 Sep 2023 23:26 )    Color: x / Appearance: x / SG: x / pH: x  Gluc: 428 mg/dL / Ketone: x  / Bili: x / Urobili: x   Blood: x / Protein: x / Nitrite: x   Leuk Esterase: x / RBC: x / WBC x   Sq Epi: x / Non Sq Epi: x / Bacteria: x      LIVER FUNCTIONS - ( 26 Sep 2023 23:26 )  Alb: 3.5 g/dL / Pro: 7.5 g/dL / ALK PHOS: 116 U/L / ALT: 39 U/L / AST: 16 U/L / GGT: x                       I&O's Detail           Patient is a 87y old  Female who presents with a chief complaint of n/v (27 Sep 2023 06:06)    HPI:  86 y/o female w/ PMH DM p/w abdominal pain x2 days. History partially gathered from pt's grand daughter Afsaneh (Emergency contact) on the phone. Pt states abdominal pain started 2 days ago and was mild at outset. Pt's granddaughter notes that this occasional happens in the past and usually subsides on its own. Pt then endorses increased pain today and vomiting on herself (NBNB emesis) at least five times. Pt endorses pain was severe when she came to the ER and has improved s/p pain medication administration (~3 hours ago). At present, pt now endorses generalized body pain and headache, but less abdominal pain.   Pt's granddaughter states that appointment to establish care w/ a PCP was made when pt's symptoms first started (Dr. Nadia Venegas) and was supposed to be later today. Pt is originally from [facility] and has been living with her son for the last few months.  (27 Sep 2023 06:06)    Renal consult called for RAQUEL. History obtained from chart.       PAST MEDICAL HISTORY:  DM (diabetes mellitus)    HLD (hyperlipidemia)        PAST SURGICAL HISTORY:  History of cholecystectomy        FAMILY HISTORY:      SOCIAL HISTORY:    Allergies    No Known Allergies    Intolerances      Home Medications:    MEDICATIONS  (STANDING):  dextrose 5%. 1000 milliLiter(s) (50 mL/Hr) IV Continuous <Continuous>  dextrose 5%. 1000 milliLiter(s) (100 mL/Hr) IV Continuous <Continuous>  dextrose 50% Injectable 25 Gram(s) IV Push once  dextrose 50% Injectable 25 Gram(s) IV Push once  dextrose 50% Injectable 12.5 Gram(s) IV Push once  glucagon  Injectable 1 milliGRAM(s) IntraMuscular once  heparin   Injectable 5000 Unit(s) SubCutaneous every 12 hours  insulin lispro (ADMELOG) corrective regimen sliding scale   SubCutaneous three times a day before meals  insulin lispro (ADMELOG) corrective regimen sliding scale   SubCutaneous at bedtime  sodium chloride 0.9%. 1000 milliLiter(s) (100 mL/Hr) IV Continuous <Continuous>    MEDICATIONS  (PRN):  acetaminophen     Tablet .. 650 milliGRAM(s) Oral every 6 hours PRN Temp greater or equal to 38C (100.4F), Mild Pain (1 - 3)  aluminum hydroxide/magnesium hydroxide/simethicone Suspension 30 milliLiter(s) Oral every 4 hours PRN Dyspepsia  dextrose Oral Gel 15 Gram(s) Oral once PRN Blood Glucose LESS THAN 70 milliGRAM(s)/deciliter  melatonin 3 milliGRAM(s) Oral at bedtime PRN Insomnia  ondansetron Injectable 4 milliGRAM(s) IV Push every 6 hours PRN Nausea and/or Vomiting      REVIEW OF SYSTEMS:  General: no fever  Respiratory: No cough, SOB  Cardiovascular: No CP or Palpitations	  Gastrointestinal: + abd pain  Genitourinary: No urinary complaints	  Musculoskeletal: No new rash or lesions		  all other systems negative    T(F): 98.4 (09-27-23 @ 06:44), Max: 98.4 (09-27-23 @ 06:44)  HR: 79 (09-27-23 @ 06:44) (79 - 93)  BP: 123/68 (09-27-23 @ 06:44) (82/52 - 123/68)  RR: 17 (09-27-23 @ 06:44) (16 - 17)  SpO2: 93% (09-27-23 @ 06:44) (93% - 98%)  Wt(kg): --    PHYSICAL EXAM:  General: NAD  Respiratory: b/l air entry  Cardiovascular: S1 S2  Gastrointestinal: soft, + tenderness  Extremities: no edema        09-26    132<L>  |  97  |  33<H>  ----------------------------<  428<H>  5.4<H>   |  25  |  2.70<H>    Ca    9.3      26 Sep 2023 23:26    TPro  7.5  /  Alb  3.5  /  TBili  0.3  /  DBili  x   /  AST  16  /  ALT  39  /  AlkPhos  116  09-26                          13.0   11.45 )-----------( 284      ( 26 Sep 2023 23:26 )             38.7       Calcium: 9.3 mg/dL (09-26 @ 23:26)  Blood Urea Nitrogen: 33 mg/dL (09-26 @ 23:26)  Potassium: 5.4 mmol/L (09-26 @ 23:26)  Hemoglobin: 13.0 g/dL (09-26 @ 23:26)      Creatinine, Serum: 2.70 (09-26 @ 23:26)      Urinalysis Basic - ( 26 Sep 2023 23:26 )    Color: x / Appearance: x / SG: x / pH: x  Gluc: 428 mg/dL / Ketone: x  / Bili: x / Urobili: x   Blood: x / Protein: x / Nitrite: x   Leuk Esterase: x / RBC: x / WBC x   Sq Epi: x / Non Sq Epi: x / Bacteria: x      LIVER FUNCTIONS - ( 26 Sep 2023 23:26 )  Alb: 3.5 g/dL / Pro: 7.5 g/dL / ALK PHOS: 116 U/L / ALT: 39 U/L / AST: 16 U/L / GGT: x                 < from: CT Abdomen and Pelvis No Cont (09.27.23 @ 00:34) >    ACC: [id number] EXAM:  CT ABDOMEN AND PELVIS   ORDERED BY: ATIF PEDRAZA     PROCEDURE DATE:  09/27/2023          INTERPRETATION:  CLINICAL INFORMATION: 87 years old. Female. diffuse   abdominal pain with vomiting.    COMPARISON: None.    CONTRAST/COMPLICATIONS:  IV Contrast: NONE  Oral Contrast: NONE  Complications: None reported at the time of study completion    PROCEDURE:  CT of the Abdomen and Pelvis was performed.  Sagittal and coronal reformats were performed.    FINDINGS:  LOWER CHEST: Moderate hiatal hernia.    LIVER: Within normal limits.  BILE DUCTS: Normal caliber.  GALLBLADDER: Cholecystectomy.  SPLEEN: Within normal limits.  PANCREAS: Within normal limits.  ADRENALS: Within normal limits.  KIDNEYS/URETERS: No radiodense calculus. No hydroureteronephrosis.    BLADDER: No radiodense debris.  REPRODUCTIVE ORGANS: Uterus is unremarkable.    BOWEL: No bowel obstruction. Appendix is unremarkable. Mild nonspecific   prominence of small bowel loops, predominantly in the right lower  quadrant, probably nonspecific enteritis.  PERITONEUM: No ascites.  VESSELS: Normal caliber abdominal aorta.  RETROPERITONEUM/LYMPH NODES: No lymphadenopathy.  ABDOMINAL WALL: Within normal limits.  BONES: Within normal limits.    IMPRESSION:    Findings suggest nonspecific enteritis.    --- End of Report ---            JORGE ACUNA MD; Attending Radiologist  This document has been electronically signed. Sep 27 2023  1:18AM    < end of copied text >

## 2023-09-27 NOTE — ED PROVIDER NOTE - DIFFERENTIAL DIAGNOSIS
Ddx includes but not limited to SBO, DKA, gastroenteritis, mesenteric ischemia, ischemic colitis, pancreatitis, liver cirrhosis, CHF Differential Diagnosis

## 2023-09-27 NOTE — PHYSICAL THERAPY INITIAL EVALUATION ADULT - PERTINENT HX OF CURRENT PROBLEM, REHAB EVAL
88 y/o female w/ PMH DM p/w abdominal pain x2 days. Pt states abdominal pain started 2 days ago and was mild at outset. Pt then endorses increased pain today and vomiting on herself (NBNB emesis) at least five times. Pt endorses pain was severe when she came to the ER and has improved s/p pain medication administration (~3 hours ago). At present, pt now endorses generalized body pain and headache, but less abdominal pain. No diarrhea.

## 2023-09-27 NOTE — CONSULT NOTE ADULT - SUBJECTIVE AND OBJECTIVE BOX
SURGERY PA CONSULT NOTE:    CHIEF COMPLAINT:  Patient is a 87y old  Female who presents with a chief complaint of Abdominal pain, N/V    HPI FROM ED:  87-year-old female with a history of HLD, DM presents with abdominal pain.  Austrian translation provided by son at the bedside.  Son reports that patient had mild generalized abdominal pain yesterday.  Today the pain significantly worsened, and is associated with 5 episodes of nonbloody vomitus.  Patient took no pain medication prior to arrival.  She reports severe constant abdominal pain that occasionally radiates to her back.  No associated diarrhea, fever, dysuria, hematuria.  History of cholecystectomy 15 years ago    INTERVAL:  86 y/o female w/ PMH DM p/w abdominal pain x2 days. History partially gathered from pt's grand daughter Afsaneh (Emergency contact) on the phone. Pt states abdominal pain started 2 days ago and was mild at outset. Pt's granddaughter notes that this occasional happens in the past and usually subsides on its own. Pt then endorses increased pain today and vomiting on herself (NBNB emesis) at least five times. Pt endorses pain was severe when she came to the ER and has improved s/p pain medication administration (~3 hours ago). At present, pt now endorses generalized body pain and headache, but less abdominal pain.   Pt's granddaughter states that appointment to establish care w/ a PCP was made when pt's symptoms first started (Dr. Nadia Venegas) and was supposed to be later today. Pt is originally from [facility] and has been living with her son for the last few months.    PAST MEDICAL & SURGICAL HISTORY:  DM (diabetes mellitus)    HLD (hyperlipidemia)    History of cholecystectomy    REVIEW OF SYSTEMS:  General/Constitutional: No acute distress, no headache, weakness, fevers, or chills   Respiratory: Denies cough/hemoptysis, denies wheezing/SOB/dyspnea  Cardiac: Denies chest pain, palpitations  Abdomen: SEE HPI  Extremities: Denies sores, swelling, discoloration bilat UE/LE  Genitourinary: Denies urinary issues or complaints, denies dysuria/hematuria  Neuro: Denies weakness, paraesthesias, paralysis, syncope, loss of vision  Skin: Denies pruritus, pain, rashes  Psych: Denies hallucinations, visual disturbances, or depression    MEDICATIONS:      ALLERGIES:  No Known Allergies    SOCIAL HISTORY:  Denies alcohol, tobacco product, or elicit drug use.  Originally from [facility]. Living w/ her son for the past few months.    VITAL SIGNS:  Vital Signs Last 24 Hrs  T(C): 36.4 (27 Sep 2023 03:13), Max: 36.4 (27 Sep 2023 03:13)  T(F): 97.6 (27 Sep 2023 03:13), Max: 97.6 (27 Sep 2023 03:13)  HR: 88 (27 Sep 2023 03:13) (88 - 93)  BP: 94/60 (27 Sep 2023 03:13) (82/52 - 94/60)  BP(mean): --  RR: 16 (27 Sep 2023 03:13) (16 - 17)  SpO2: 95% (27 Sep 2023 03:13) (95% - 98%)    Parameters below as of 27 Sep 2023 03:13  Patient On (Oxygen Delivery Method): room air    PHYSICAL EXAM:  General: No acute distress, appears comfortable, well-groomed, appears stated age, elderly female  Head, Eyes, Ears, Nose, Throat: Normal cephalic/atraumatic, anicteric, conjunctiva-non injected and moist, vision grossly intact, hearing grossly intact  Abdomen: Scar from prior ccy noted in RUQ; distended but soft, mild generalized TTP; no guarding, no rebound ttp, no osvaldo peritonitic features.  Extremity: No swelling, or open sores, no gross deformities,  good range of motion  Skin: Good color, turgor, texture with no gross lesions, no eruptions, no rashes, no subcutaneous nodules and normal temperature.     LABS:                      13.0   11.45 )-----------( 284      ( 26 Sep 2023 23:26 )             38.7     09-26    132<L>  |  97  |  33<H>  ----------------------------<  428<H>  5.4<H>   |  25  |  2.70<H>    Ca    9.3      26 Sep 2023 23:26    TPro  7.5  /  Alb  3.5  /  TBili  0.3  /  DBili  x   /  AST  16  /  ALT  39  /  AlkPhos  116  09-26    PT/INR - ( 26 Sep 2023 23:26 )   PT: 10.4 sec;   INR: 0.89 ratio    PTT - ( 26 Sep 2023 23:26 )  PTT:19.6 sec  Urinalysis Basic - ( 26 Sep 2023 23:26 )    Color: x / Appearance: x / SG: x / pH: x  Gluc: 428 mg/dL / Ketone: x  / Bili: x / Urobili: x   Blood: x / Protein: x / Nitrite: x   Leuk Esterase: x / RBC: x / WBC x   Sq Epi: x / Non Sq Epi: x / Bacteria: x    LIVER FUNCTIONS - ( 26 Sep 2023 23:26 )  Alb: 3.5 g/dL / Pro: 7.5 g/dL / ALK PHOS: 116 U/L / ALT: 39 U/L / AST: 16 U/L / GGT: x           RADIOLOGY & ADDITIONAL STUDIES:  ACC: [id number] EXAM:  CT ABDOMEN AND PELVIS   ORDERED BY: ATIF PEDRAZA   PROCEDURE DATE:  09/27/2023      FINDINGS:  LOWER CHEST: Moderate hiatal hernia.    LIVER: Within normal limits.  BILE DUCTS: Normal caliber.  GALLBLADDER: Cholecystectomy.  SPLEEN: Within normal limits.  PANCREAS: Within normal limits.  ADRENALS: Within normal limits.  KIDNEYS/URETERS: No radiodense calculus. No hydroureteronephrosis.    BLADDER: No radiodense debris.  REPRODUCTIVE ORGANS: Uterus is unremarkable.    BOWEL: No bowel obstruction. Appendix is unremarkable. Mild nonspecific   prominence of small bowel loops, predominantly in the right lower  quadrant, probably nonspecific enteritis.  PERITONEUM: No ascites.  VESSELS: Normal caliber abdominal aorta.  RETROPERITONEUM/LYMPH NODES: No lymphadenopathy.  ABDOMINAL WALL: Within normal limits.  BONES: Within normal limits.    IMPRESSION:    Findings suggest nonspecific enteritis.    --- End of Report ---  JORGE ACUNA MD; Attending Radiologist    ASSESSMENT:  86 y/o female w/ PMH DM p/w abdominal pain, N/V x2 days. CTAP noncontrast reveals "mild nonspecific prominence of small bowel loops, predominantly in the right lower quadrant, probably nonspecific enteritis."  Afebrile. Hypotensive s/p 1 liter NS. Additional liter given.  Labs reveal leukocytosis to 11K. Elevated lactate 3.6, before 2nd fluid bolus.  At time of encounter pt endorses abdominal pain has decreased in comparison to this morning. Abdominal exam reveals a distended and soft abdomen w/ mild generalized tenderness without guarding or rebound tenderness.    PLAN:    - Admit to medicine, will attempt conservative management of gastroenteritis.  - NPO  - ?lactate possible elevated 2/2 low-flow state following repeated vomiting  - IV Fluid resuscitation; f/u repeat lactate  - Anti-emetics prn, pain control  - Will follow  - Case to be d/w Dr. Lopez; Will endorse overnight events to day team.

## 2023-09-27 NOTE — CARE COORDINATION ASSESSMENT. - NSPASTMEDSURGHISTORY_GEN_ALL_CORE_FT
PAST MEDICAL & SURGICAL HISTORY:  HLD (hyperlipidemia)      DM (diabetes mellitus)      History of cholecystectomy

## 2023-09-27 NOTE — ED ADULT NURSE REASSESSMENT NOTE - NS ED NURSE REASSESS COMMENT FT1
Pt provided scant urine for sample> Dr. Salmeron was previously informed. Nurse Obdulio was informed to follow up

## 2023-09-27 NOTE — PHYSICAL THERAPY INITIAL EVALUATION ADULT - ADL SKILLS, REHAB EVAL
[address] [facility]     Emergency Department     Faculty Attestation    I performed a history and physical examination of the patient and discussed management with the resident. I reviewed the resident´s note and agree with the documented findings and plan of care. Any areas of disagreement are noted on the chart. I was personally present for the key portions of any procedures. I have documented in the chart those procedures where I was not present during the key portions. I have reviewed the emergency nurses triage note. I agree with the chief complaint, past medical history, past surgical history, allergies, medications, social and family history as documented unless otherwise noted below. For Physician Assistant/ Nurse Practitioner cases/documentation I have personally evaluated this patient and have completed at least one if not all key elements of the E/M (history, physical exam, and MDM). Additional findings are as noted. Scattered end expiratory wheezes without respiratory distress, heart regular rate and rhythm, no lower extremity pain or swelling on examination.        EKG Interpretation    Interpreted by emergency department physician    Rhythm: normal sinus   Rate: normal/93  Axis: normal 29  Ectopy: none  Conduction: normal  ST Segments: no acute change  T Waves: no acute change  Q Waves: none    Clinical Impression: Normal EKG    Azalia Pollack, CHEIKH Pollack MD  05/18/22 [id number] independent

## 2023-09-27 NOTE — H&P ADULT - HISTORY OF PRESENT ILLNESS
86 y/o female w/ PMH DM p/w abdominal pain x2 days. History partially gathered from pt's grand daughter Afsaneh (Emergency contact) on the phone. Pt states abdominal pain started 2 days ago and was mild at outset. Pt's granddaughter notes that this occasional happens in the past and usually subsides on its own. Pt then endorses increased pain today and vomiting on herself (NBNB emesis) at least five times. Pt endorses pain was severe when she came to the ER and has improved s/p pain medication administration (~3 hours ago). At present, pt now endorses generalized body pain and headache, but less abdominal pain.   Pt's granddaughter states that appointment to establish care w/ a PCP was made when pt's symptoms first started (Dr. Nadia Venegas) and was supposed to be later today. Pt is originally from [facility] and has been living with her son for the last few months.   88 y/o female w/ PMH DM p/w abdominal pain x2 days. History partially gathered from pt's grand daughter Afsaneh (Emergency contact) on the phone. Pt states abdominal pain started 2 days ago and was mild at outset. Pt's granddaughter notes that this occasional happens in the past and usually subsides on its own. Pt then endorses increased pain today and vomiting on herself (NBNB emesis) at least five times. Pt endorses pain was severe when she came to the ER and has improved s/p pain medication administration (~3 hours ago). At present, pt now endorses generalized body pain and headache, but less abdominal pain. No diarrhea.  Pt's granddaughter states that appointment to establish care w/ a PCP was made when pt's symptoms first started (Dr. Nadia Venegas) and was supposed to be later today. Pt is originally from [facility] and has been living with her son for the last few months.   86 y/o female w/ PMH DM p/w abdominal pain x2 days. History partially gathered from pt's grand daughter Afsaneh (Emergency contact) on the phone. Pt states abdominal pain started 2 days ago and was mild at outset. Pt's granddaughter notes that this occasional happens in the past and usually subsides on its own. Pt then endorses increased pain today and vomiting on herself (NBNB emesis) at least five times. Pt endorses pain was severe when she came to the ER and has improved s/p pain medication administration (~3 hours ago). At present, pt now endorses generalized body pain and headache, but less abdominal pain. No diarrhea.  Pt's granddaughter states that appointment to establish care w/ a PCP was made when pt's symptoms first started (Dr. Nadia Venegas) and was supposed to be later today. Pt is originally from [facility] and has been living with her son for the last few months. Patient hypotensive in ER - SBP 80s

## 2023-09-27 NOTE — ED PROVIDER NOTE - PROGRESS NOTE DETAILS
Case d/w surgery PA who discussed with Dr. Lopez.  No evidence of acute ischemia at this time time.  Admit to medicine

## 2023-09-27 NOTE — H&P ADULT - PROBLEM SELECTOR PLAN 2
Likely 2/2 to dehydration from n/v  IVF  Trend BMP  Avoid nephrotoxic medications  Renal consult  Further work-up/management pending clinical course.

## 2023-09-27 NOTE — PATIENT PROFILE ADULT - PRO INTERPRETER NEED 2
Iranian Clindamycin Pregnancy And Lactation Text: This medication can be used in pregnancy if certain situations. Clindamycin is also present in breast milk.

## 2023-09-27 NOTE — ED PROVIDER NOTE - CLINICAL SUMMARY MEDICAL DECISION MAKING FREE TEXT BOX
87 year old female with DM, HLD p/w abdominal pain, V x 5, abdominal distention.  No diarrhea or fever,  H/o cholecystectomy.  Check labs, lactate, UA, CT abd/pelvis, consult surgery, admit

## 2023-09-27 NOTE — PROVIDER CONTACT NOTE (CRITICAL VALUE NOTIFICATION) - ACTION/TREATMENT ORDERED:
Bolus 1000 ccx1 and repeat level at 2 pm.
Post fluid. No additional order at present
Antibiotics and repeat lactate after fluid completion

## 2023-09-27 NOTE — CONSULT NOTE ADULT - ASSESSMENT
enteritis  elevated lfts    advance diet as tolerated  abrupt rise in lfts  ? related to antibiotics  observe off antibiotics  check GI pcr  will follow 
RAQUEL: Prerenal azotemia, Ischemic ATN  Enteritis  Hypotension  Diabetes    Continue IV hydration. No evidence of obstructive uropathy on CT scan. BP better. Avoid hypotension. Monitor blood sugar levels. Insulin coverage as needed.  Monitor BP trend. GI follow up. Avoid nephrotoxic meds as possible. Will follow electrolytes and renal function trend.     Further recommendations pending clinical course. Thank you for the courtesy of this referral.

## 2023-09-27 NOTE — CARE COORDINATION ASSESSMENT. - NSCAREPROVIDERS_GEN_ALL_CORE_FT
CARE PROVIDERS:  Accepting Physician: Epifanio Saab  Access Services: Janelle Hood  Administration: Carmelina Ngo  Admitting: Epifanio Saab  Attending: Epifanio Saab  Consultant: Cornel Gongora  Consultant: Perlman, Craig  Consultant: Valorie Johnson  Consultant: Weil, Patricia  Consultant: Den Lopez  Consultant: Bruno Smith  Consultant: Tyrel Grossman  Consultant: Anjum Singh  Consultant: Wilbert Clark  Covering Team: Amy Orozco  Covering Team: Epifanio Saab  ED Attending: Ashley Salmeron  ED Nurse: Kari House  Nurse: Obdulio Haynes  Nurse: Daphnie Patrick  Nurse: Maddy Bojorquez  Ordered: ADM, User  Outpatient Provider: Anjum Singh  Override: Chapito Amos  Override: Maddy Bojorquez  Override: Brittany Conrad  Override: Obdulio Haynes  PCA/Nursing Assistant: Brittany Conrad  PCA/Nursing Assistant: Sav Syed  PCA/Nursing Assistant: Yen Larios  Physical Therapy: Noy Velasquez  Physical Therapy: Fabiola Shelton  Primary Team: Loraine Troy  Primary Team: Meera Salomon  Registered Dietitian: Rosa Maxwell// Supp. Assoc.: Viola Pichardo

## 2023-09-27 NOTE — ED PROVIDER NOTE - TEMPLATE
LMTCB. Dr. Mayer ordered the echo stress and Lipitor 10 mg once a day. He also needs to set up another liver and lipid in 4-6 weeks.    Abdominal Pain, N/V/D

## 2023-09-27 NOTE — CONSULT NOTE ADULT - NS ATTEND AMEND GEN_ALL_CORE FT
Discussed with consulting PA this morning approximately 7 am.   Seen by me this morning approximately 8:30.  Admitting history, labs and imaging personally reviewed.  Admitted with suspected enteritis.  Pt resting comfortably.   On clear liquids and tolerating.  Minimal RLQ tenderness, no rebound or guarding.                        13.0   11.45 )-----------( 284      ( 26 Sep 2023 23:26 )             38.7         132<L>  |  97  |  33<H>  ----------------------------<  428<H>  5.4<H>   |  25  |  2.70<H>    Ca    9.3      26 Sep 2023 23:26    TPro  7.5  /  Alb  3.5  /  TBili  0.3  /  DBili  x   /  AST  16  /  ALT  39  /  AlkPhos  116  09-26    PT/INR - ( 26 Sep 2023 23:26 )   PT: 10.4 sec;   INR: 0.89 ratio         PTT - ( 26 Sep 2023 23:26 )  PTT:19.6 sec    No acute surgical intervention indicated.  Consider stool cultures if develops diarrhea.  IVF's and correct electrolytes.  Hyponatremic, hyperkalemic. Hyperglycemic.  Acute renal insufficiency, may be due to dehydration from vomiting.  Monitor abdominal exam and symptoms.

## 2023-09-27 NOTE — ED PROVIDER NOTE - CARE PLAN
1 Principal Discharge DX:	Abdominal pain  Secondary Diagnosis:	Enteritis  Secondary Diagnosis:	Renal insufficiency

## 2023-09-27 NOTE — CONSULT NOTE ADULT - PROBLEM SELECTOR RECOMMENDATION 9
cont mod dose admelog corrective scale coverage qac/qhs  cont cons cho diet  goal bg 100-180 in hosp setting

## 2023-09-27 NOTE — H&P ADULT - PROBLEM SELECTOR PLAN 3
Hold BP meds due to hypotension Possibly 2/2 to low flow state -- patient was hypotensive upon arrival to ER SBP 80s  Trend enzymes  GI consult

## 2023-09-27 NOTE — CARE COORDINATION ASSESSMENT. - OTHER PERTINENT DISCHARGE PLANNING INFORMATION:
Patient admitted for Nausea and vomiting. Patient lives with adult children. The patient children assist with the patients care. The patient has no DME. The patients HC needs are unknown. Patient educated on the role of CM and discussed DC planning. All questions answered.

## 2023-09-27 NOTE — PATIENT PROFILE ADULT - NSPRONUTRITIONRISK_GEN_A_NUR
Medication name: amphetamine-dextroamphetamine (ADDERALL) 20 MG tablet  Last Refill Details: Date 01/17/23, # of tablets: 60, # of refills approved:0  Ordering provider name: Rodrigo Rider DO  Last office visit: 02/06/23 with provider Rodrigo Rider DO  Unable to refill medication per established guidelines, request forwarded to provider for review.  Caller advised to contact office for follow-up.    No indicators present

## 2023-09-27 NOTE — PHYSICAL THERAPY INITIAL EVALUATION ADULT - BED MOBILITY TRAINING, PT EVAL
Pt will be able to perform all bed mobility independently in 2 weeks to improve functional independence.

## 2023-09-27 NOTE — H&P ADULT - PROBLEM SELECTOR PLAN 4
RISS  Check A1C  Endocrine consult  Further work-up/management pending clinical course. Hold BP meds due to hypotension

## 2023-09-27 NOTE — PHYSICAL THERAPY INITIAL EVALUATION ADULT - ADDITIONAL COMMENTS
87F lives in apartment with children (son & daughter). Reports 3 IFRAH but uses ramp to enter building. Pt reports independent with ADLs and does not use any ADs when ambulating within the community.

## 2023-09-28 VITALS
OXYGEN SATURATION: 94 % | HEART RATE: 94 BPM | DIASTOLIC BLOOD PRESSURE: 65 MMHG | TEMPERATURE: 98 F | SYSTOLIC BLOOD PRESSURE: 158 MMHG | RESPIRATION RATE: 18 BRPM

## 2023-09-28 LAB
A1C WITH ESTIMATED AVERAGE GLUCOSE RESULT: 10.7 % — HIGH (ref 4–5.6)
ALBUMIN SERPL ELPH-MCNC: 2.9 G/DL — LOW (ref 3.3–5)
ALP SERPL-CCNC: 119 U/L — SIGNIFICANT CHANGE UP (ref 40–120)
ALT FLD-CCNC: 122 U/L — HIGH (ref 12–78)
ANION GAP SERPL CALC-SCNC: 6 MMOL/L — SIGNIFICANT CHANGE UP (ref 5–17)
AST SERPL-CCNC: 91 U/L — HIGH (ref 15–37)
BILIRUB SERPL-MCNC: 0.4 MG/DL — SIGNIFICANT CHANGE UP (ref 0.2–1.2)
BUN SERPL-MCNC: 15 MG/DL — SIGNIFICANT CHANGE UP (ref 7–23)
CALCIUM SERPL-MCNC: 8.4 MG/DL — LOW (ref 8.5–10.1)
CHLORIDE SERPL-SCNC: 109 MMOL/L — HIGH (ref 96–108)
CO2 SERPL-SCNC: 25 MMOL/L — SIGNIFICANT CHANGE UP (ref 22–31)
CREAT SERPL-MCNC: 1.2 MG/DL — SIGNIFICANT CHANGE UP (ref 0.5–1.3)
EGFR: 44 ML/MIN/1.73M2 — LOW
ESTIMATED AVERAGE GLUCOSE: 260 MG/DL — HIGH (ref 68–114)
GLUCOSE BLDC GLUCOMTR-MCNC: 188 MG/DL — HIGH (ref 70–99)
GLUCOSE BLDC GLUCOMTR-MCNC: 188 MG/DL — HIGH (ref 70–99)
GLUCOSE SERPL-MCNC: 180 MG/DL — HIGH (ref 70–99)
HCT VFR BLD CALC: 29.9 % — LOW (ref 34.5–45)
HGB BLD-MCNC: 9.8 G/DL — LOW (ref 11.5–15.5)
MAGNESIUM SERPL-MCNC: 2 MG/DL — SIGNIFICANT CHANGE UP (ref 1.6–2.6)
MCHC RBC-ENTMCNC: 29.5 PG — SIGNIFICANT CHANGE UP (ref 27–34)
MCHC RBC-ENTMCNC: 32.8 GM/DL — SIGNIFICANT CHANGE UP (ref 32–36)
MCV RBC AUTO: 90.1 FL — SIGNIFICANT CHANGE UP (ref 80–100)
NRBC # BLD: 0 /100 WBCS — SIGNIFICANT CHANGE UP (ref 0–0)
PLATELET # BLD AUTO: 225 K/UL — SIGNIFICANT CHANGE UP (ref 150–400)
POTASSIUM SERPL-MCNC: 4.3 MMOL/L — SIGNIFICANT CHANGE UP (ref 3.5–5.3)
POTASSIUM SERPL-SCNC: 4.3 MMOL/L — SIGNIFICANT CHANGE UP (ref 3.5–5.3)
PROT SERPL-MCNC: 6.4 G/DL — SIGNIFICANT CHANGE UP (ref 6–8.3)
RBC # BLD: 3.32 M/UL — LOW (ref 3.8–5.2)
RBC # FLD: 13 % — SIGNIFICANT CHANGE UP (ref 10.3–14.5)
SODIUM SERPL-SCNC: 140 MMOL/L — SIGNIFICANT CHANGE UP (ref 135–145)
WBC # BLD: 5.23 K/UL — SIGNIFICANT CHANGE UP (ref 3.8–10.5)
WBC # FLD AUTO: 5.23 K/UL — SIGNIFICANT CHANGE UP (ref 3.8–10.5)

## 2023-09-28 PROCEDURE — 96375 TX/PRO/DX INJ NEW DRUG ADDON: CPT

## 2023-09-28 PROCEDURE — 83690 ASSAY OF LIPASE: CPT

## 2023-09-28 PROCEDURE — 74176 CT ABD & PELVIS W/O CONTRAST: CPT

## 2023-09-28 PROCEDURE — 80053 COMPREHEN METABOLIC PANEL: CPT

## 2023-09-28 PROCEDURE — 85610 PROTHROMBIN TIME: CPT

## 2023-09-28 PROCEDURE — 87635 SARS-COV-2 COVID-19 AMP PRB: CPT

## 2023-09-28 PROCEDURE — 85027 COMPLETE CBC AUTOMATED: CPT

## 2023-09-28 PROCEDURE — 85025 COMPLETE CBC W/AUTO DIFF WBC: CPT

## 2023-09-28 PROCEDURE — 96374 THER/PROPH/DIAG INJ IV PUSH: CPT

## 2023-09-28 PROCEDURE — 82962 GLUCOSE BLOOD TEST: CPT

## 2023-09-28 PROCEDURE — 97162 PT EVAL MOD COMPLEX 30 MIN: CPT

## 2023-09-28 PROCEDURE — 97116 GAIT TRAINING THERAPY: CPT

## 2023-09-28 PROCEDURE — 97530 THERAPEUTIC ACTIVITIES: CPT

## 2023-09-28 PROCEDURE — 83605 ASSAY OF LACTIC ACID: CPT

## 2023-09-28 PROCEDURE — 99285 EMERGENCY DEPT VISIT HI MDM: CPT | Mod: 25

## 2023-09-28 PROCEDURE — 87040 BLOOD CULTURE FOR BACTERIA: CPT

## 2023-09-28 PROCEDURE — 85730 THROMBOPLASTIN TIME PARTIAL: CPT

## 2023-09-28 PROCEDURE — 83036 HEMOGLOBIN GLYCOSYLATED A1C: CPT

## 2023-09-28 PROCEDURE — 93005 ELECTROCARDIOGRAM TRACING: CPT

## 2023-09-28 PROCEDURE — 83735 ASSAY OF MAGNESIUM: CPT

## 2023-09-28 PROCEDURE — 36415 COLL VENOUS BLD VENIPUNCTURE: CPT

## 2023-09-28 RX ADMIN — Medication 2: at 08:19

## 2023-09-28 RX ADMIN — SODIUM CHLORIDE 80 MILLILITER(S): 9 INJECTION INTRAMUSCULAR; INTRAVENOUS; SUBCUTANEOUS at 01:55

## 2023-09-28 RX ADMIN — HEPARIN SODIUM 5000 UNIT(S): 5000 INJECTION INTRAVENOUS; SUBCUTANEOUS at 06:09

## 2023-09-28 RX ADMIN — Medication 2: at 11:44

## 2023-09-28 NOTE — DISCHARGE NOTE NURSING/CASE MANAGEMENT/SOCIAL WORK - NSDCPEFALRISK_GEN_ALL_CORE
For information on Fall & Injury Prevention, visit: [email or web address] OR  [email or web address] OR  https://www.cdc.gov/steadi/patient.html

## 2023-09-28 NOTE — CASE MANAGEMENT PROGRESS NOTE - NSCMPROGRESSNOTE_GEN_ALL_CORE
PT recommended RW for home use. Spoke to grandter w patient at bedside. Both are declining need for same, stating she wont use it and has done well w home management PTA.

## 2023-09-28 NOTE — PROGRESS NOTE ADULT - SUBJECTIVE AND OBJECTIVE BOX
Date of Service: 09-28-23 @ 10:14    Patient is a 87y old  Female who presents with a chief complaint of n/v (28 Sep 2023 09:53)      INTERVAL HPI/OVERNIGHT EVENTS: Patient seen and examined. NAD. No complaints.    Vital Signs Last 24 Hrs  T(C): 36.9 (28 Sep 2023 04:54), Max: 36.9 (28 Sep 2023 04:54)  T(F): 98.5 (28 Sep 2023 04:54), Max: 98.5 (28 Sep 2023 04:54)  HR: 92 (28 Sep 2023 04:54) (70 - 95)  BP: 142/70 (28 Sep 2023 04:54) (110/67 - 149/70)  BP(mean): --  RR: 18 (28 Sep 2023 04:54) (18 - 19)  SpO2: 92% (28 Sep 2023 04:54) (92% - 98%)    Parameters below as of 28 Sep 2023 04:54  Patient On (Oxygen Delivery Method): room air        09-28    140  |  109<H>  |  15  ----------------------------<  180<H>  4.3   |  25  |  1.20    Ca    8.4<L>      28 Sep 2023 06:30  Mg     2.0     09-28    TPro  6.4  /  Alb  2.9<L>  /  TBili  0.4  /  DBili  x   /  AST  91<H>  /  ALT  122<H>  /  AlkPhos  119  09-28                          9.8    5.23  )-----------( 225      ( 28 Sep 2023 06:30 )             29.9     PT/INR - ( 26 Sep 2023 23:26 )   PT: 10.4 sec;   INR: 0.89 ratio         PTT - ( 26 Sep 2023 23:26 )  PTT:19.6 sec  CAPILLARY BLOOD GLUCOSE      POCT Blood Glucose.: 188 mg/dL (28 Sep 2023 08:17)  POCT Blood Glucose.: 214 mg/dL (27 Sep 2023 20:52)  POCT Blood Glucose.: 149 mg/dL (27 Sep 2023 16:36)  POCT Blood Glucose.: 205 mg/dL (27 Sep 2023 11:44)    Urinalysis Basic - ( 28 Sep 2023 06:30 )    Color: x / Appearance: x / SG: x / pH: x  Gluc: 180 mg/dL / Ketone: x  / Bili: x / Urobili: x   Blood: x / Protein: x / Nitrite: x   Leuk Esterase: x / RBC: x / WBC x   Sq Epi: x / Non Sq Epi: x / Bacteria: x              acetaminophen     Tablet .. 650 milliGRAM(s) Oral every 6 hours PRN  aluminum hydroxide/magnesium hydroxide/simethicone Suspension 30 milliLiter(s) Oral every 4 hours PRN  dextrose 5%. 1000 milliLiter(s) IV Continuous <Continuous>  dextrose 5%. 1000 milliLiter(s) IV Continuous <Continuous>  dextrose 50% Injectable 12.5 Gram(s) IV Push once  dextrose 50% Injectable 25 Gram(s) IV Push once  dextrose 50% Injectable 25 Gram(s) IV Push once  dextrose Oral Gel 15 Gram(s) Oral once PRN  glucagon  Injectable 1 milliGRAM(s) IntraMuscular once  heparin   Injectable 5000 Unit(s) SubCutaneous every 12 hours  influenza  Vaccine (HIGH DOSE) 0.7 milliLiter(s) IntraMuscular once  insulin lispro (ADMELOG) corrective regimen sliding scale   SubCutaneous three times a day before meals  insulin lispro (ADMELOG) corrective regimen sliding scale   SubCutaneous at bedtime  melatonin 3 milliGRAM(s) Oral at bedtime PRN  ondansetron Injectable 4 milliGRAM(s) IV Push every 6 hours PRN  sodium chloride 0.9%. 1000 milliLiter(s) IV Continuous <Continuous>              REVIEW OF SYSTEMS:  CONSTITUTIONAL: No fever, no weight loss, or no fatigue  NECK: No pain, no stiffness  RESPIRATORY: No cough, no wheezing, no chills, no hemoptysis, No shortness of breath  CARDIOVASCULAR: No chest pain, no palpitations, no dizziness, no leg swelling  GASTROINTESTINAL: No abdominal pain. No nausea, no vomiting, no hematemesis; No diarrhea, no constipation. No melena, no hematochezia.  GENITOURINARY: No dysuria, no frequency, no hematuria, no incontinence  NEUROLOGICAL: No headaches, no loss of strength, no numbness, no tremors  SKIN: No itching, no burning  MUSCULOSKELETAL: No joint pain, no swelling; No muscle, no back, no extremity pain  PSYCHIATRIC: No depression, no mood swings,   HEME/LYMPH: No easy bruising, no bleeding gums  ALLERY AND IMMUNOLOGIC: No hives       Consultant(s) Notes Reviewed:  [X] YES  [ ] NO    PHYSICAL EXAM:  GENERAL: NAD  HEAD:  Atraumatic, Normocephalic  EYES: EOMI, PERRLA, conjunctiva and sclera clear  ENMT: No tonsillar erythema, exudates, or enlargement; Moist mucous membranes  NECK: Supple, No JVD  NERVOUS SYSTEM:  Awake & alert  CHEST/LUNG: Clear to auscultation bilaterally; No rales, rhonchi, wheezing,  HEART: Regular rate and rhythm  ABDOMEN: Soft, Nontender, Nondistended; Bowel sounds present  EXTREMITIES:  No clubbing, cyanosis, or edema  LYMPH: No lymphadenopathy noted  SKIN: No rashes      Advanced care planning discussed with patient/family [X] YES   [ ] NO    Advanced care planning discussed with patient/family. Patient's health status was discussed. All appropriate changes have been made regarding patient's end-of-life care. Advanced care planning forms reviewed/discussed/completed.  20 minutes spent.    Date of Service: 09-28-23 @ 10:14    Patient is a 87y old  Female who presents with a chief complaint of n/v (28 Sep 2023 09:53)      INTERVAL HPI/OVERNIGHT EVENTS: Patient seen and examined. NAD. No complaints.    Vital Signs Last 24 Hrs  T(C): 36.9 (28 Sep 2023 04:54), Max: 36.9 (28 Sep 2023 04:54)  T(F): 98.5 (28 Sep 2023 04:54), Max: 98.5 (28 Sep 2023 04:54)  HR: 92 (28 Sep 2023 04:54) (70 - 95)  BP: 142/70 (28 Sep 2023 04:54) (110/67 - 149/70)  BP(mean): --  RR: 18 (28 Sep 2023 04:54) (18 - 19)  SpO2: 92% (28 Sep 2023 04:54) (92% - 98%)    Parameters below as of 28 Sep 2023 04:54  Patient On (Oxygen Delivery Method): room air        09-28    140  |  109<H>  |  15  ----------------------------<  180<H>  4.3   |  25  |  1.20    Ca    8.4<L>      28 Sep 2023 06:30  Mg     2.0     09-28    TPro  6.4  /  Alb  2.9<L>  /  TBili  0.4  /  DBili  x   /  AST  91<H>  /  ALT  122<H>  /  AlkPhos  119  09-28                          9.8    5.23  )-----------( 225      ( 28 Sep 2023 06:30 )             29.9     PT/INR - ( 26 Sep 2023 23:26 )   PT: 10.4 sec;   INR: 0.89 ratio         PTT - ( 26 Sep 2023 23:26 )  PTT:19.6 sec  CAPILLARY BLOOD GLUCOSE      POCT Blood Glucose.: 188 mg/dL (28 Sep 2023 08:17)  POCT Blood Glucose.: 214 mg/dL (27 Sep 2023 20:52)  POCT Blood Glucose.: 149 mg/dL (27 Sep 2023 16:36)  POCT Blood Glucose.: 205 mg/dL (27 Sep 2023 11:44)    Urinalysis Basic - ( 28 Sep 2023 06:30 )    Color: x / Appearance: x / SG: x / pH: x  Gluc: 180 mg/dL / Ketone: x  / Bili: x / Urobili: x   Blood: x / Protein: x / Nitrite: x   Leuk Esterase: x / RBC: x / WBC x   Sq Epi: x / Non Sq Epi: x / Bacteria: x    Culture - Blood (09.27.23 @ 01:00)   Specimen Source: .Blood Blood-Venous  Culture Results:   No growth at 24 hours      Historical Values  Culture - Blood (09.27.23 @ 01:00)   Specimen Source: .Blood Blood-Venous  Culture Results:   No growth at 24 hours  Culture - Blood (09.27.23 @ 01:00)   Specimen Source: .Blood Blood-Venous  Culture Results:   No growth at 24 hours          acetaminophen     Tablet .. 650 milliGRAM(s) Oral every 6 hours PRN  aluminum hydroxide/magnesium hydroxide/simethicone Suspension 30 milliLiter(s) Oral every 4 hours PRN  dextrose 5%. 1000 milliLiter(s) IV Continuous <Continuous>  dextrose 5%. 1000 milliLiter(s) IV Continuous <Continuous>  dextrose 50% Injectable 12.5 Gram(s) IV Push once  dextrose 50% Injectable 25 Gram(s) IV Push once  dextrose 50% Injectable 25 Gram(s) IV Push once  dextrose Oral Gel 15 Gram(s) Oral once PRN  glucagon  Injectable 1 milliGRAM(s) IntraMuscular once  heparin   Injectable 5000 Unit(s) SubCutaneous every 12 hours  influenza  Vaccine (HIGH DOSE) 0.7 milliLiter(s) IntraMuscular once  insulin lispro (ADMELOG) corrective regimen sliding scale   SubCutaneous three times a day before meals  insulin lispro (ADMELOG) corrective regimen sliding scale   SubCutaneous at bedtime  melatonin 3 milliGRAM(s) Oral at bedtime PRN  ondansetron Injectable 4 milliGRAM(s) IV Push every 6 hours PRN  sodium chloride 0.9%. 1000 milliLiter(s) IV Continuous <Continuous>              REVIEW OF SYSTEMS:  CONSTITUTIONAL: No fever, no weight loss, or no fatigue  NECK: No pain, no stiffness  RESPIRATORY: No cough, no wheezing, no chills, no hemoptysis, No shortness of breath  CARDIOVASCULAR: No chest pain, no palpitations, no dizziness, no leg swelling  GASTROINTESTINAL: No abdominal pain. No nausea, no vomiting, no hematemesis; No diarrhea, no constipation. No melena, no hematochezia.  GENITOURINARY: No dysuria, no frequency, no hematuria, no incontinence  NEUROLOGICAL: No headaches, no loss of strength, no numbness, no tremors  SKIN: No itching, no burning  MUSCULOSKELETAL: No joint pain, no swelling; No muscle, no back, no extremity pain  PSYCHIATRIC: No depression, no mood swings,   HEME/LYMPH: No easy bruising, no bleeding gums  ALLERY AND IMMUNOLOGIC: No hives       Consultant(s) Notes Reviewed:  [X] YES  [ ] NO    PHYSICAL EXAM:  GENERAL: NAD  HEAD:  Atraumatic, Normocephalic  EYES: EOMI, PERRLA, conjunctiva and sclera clear  ENMT: No tonsillar erythema, exudates, or enlargement; Moist mucous membranes  NECK: Supple, No JVD  NERVOUS SYSTEM:  Awake & alert  CHEST/LUNG: Clear to auscultation bilaterally; No rales, rhonchi, wheezing,  HEART: Regular rate and rhythm  ABDOMEN: Soft, Nontender, Nondistended; Bowel sounds present  EXTREMITIES:  No clubbing, cyanosis, or edema  LYMPH: No lymphadenopathy noted  SKIN: No rashes      Advanced care planning discussed with patient/family [X] YES   [ ] NO    Advanced care planning discussed with patient/family. Patient's health status was discussed. All appropriate changes have been made regarding patient's end-of-life care. Advanced care planning forms reviewed/discussed/completed.  20 minutes spent.

## 2023-09-28 NOTE — PROGRESS NOTE ADULT - SUBJECTIVE AND OBJECTIVE BOX
INTERVAL HPI/OVERNIGHT EVENTS:    doing better  lfts improving   no new gi events       MEDICATIONS  (STANDING):  dextrose 5%. 1000 milliLiter(s) (50 mL/Hr) IV Continuous <Continuous>  dextrose 5%. 1000 milliLiter(s) (100 mL/Hr) IV Continuous <Continuous>  dextrose 50% Injectable 25 Gram(s) IV Push once  dextrose 50% Injectable 25 Gram(s) IV Push once  dextrose 50% Injectable 12.5 Gram(s) IV Push once  glucagon  Injectable 1 milliGRAM(s) IntraMuscular once  heparin   Injectable 5000 Unit(s) SubCutaneous every 12 hours  influenza  Vaccine (HIGH DOSE) 0.7 milliLiter(s) IntraMuscular once  insulin lispro (ADMELOG) corrective regimen sliding scale   SubCutaneous at bedtime  insulin lispro (ADMELOG) corrective regimen sliding scale   SubCutaneous three times a day before meals    MEDICATIONS  (PRN):  acetaminophen     Tablet .. 650 milliGRAM(s) Oral every 6 hours PRN Temp greater or equal to 38C (100.4F), Mild Pain (1 - 3)  aluminum hydroxide/magnesium hydroxide/simethicone Suspension 30 milliLiter(s) Oral every 4 hours PRN Dyspepsia  dextrose Oral Gel 15 Gram(s) Oral once PRN Blood Glucose LESS THAN 70 milliGRAM(s)/deciliter  melatonin 3 milliGRAM(s) Oral at bedtime PRN Insomnia  ondansetron Injectable 4 milliGRAM(s) IV Push every 6 hours PRN Nausea and/or Vomiting      Allergies    No Known Allergies    Intolerances        Review of Systems:    General:  No wt loss, fevers, chills, night sweats, fatigue   Eyes:  Good vision, no reported pain  ENT:  No sore throat, pain, runny nose, dysphagia  CV:  No pain, palpitations, hypo/hypertension  Resp:  No dyspnea, cough, tachypnea, wheezing  GI:  No pain, No nausea, No vomiting, No diarrhea, No constipation, No weight loss, No fever, No pruritis, No rectal bleeding, No melena, No dysphagia  :  No pain, bleeding, incontinence, nocturia  Muscle:  No pain, weakness  Neuro:  No weakness, tingling, memory problems  Psych:  No fatigue, insomnia, mood problems, depression  Endocrine:  No polyuria, polydypsia, cold/heat intolerance  Heme:  No petechiae, ecchymosis, easy bruisability  Skin:  No rash, tattoos, scars, edema      Vital Signs Last 24 Hrs  T(C): 36.9 (28 Sep 2023 04:54), Max: 36.9 (28 Sep 2023 04:54)  T(F): 98.5 (28 Sep 2023 04:54), Max: 98.5 (28 Sep 2023 04:54)  HR: 92 (28 Sep 2023 04:54) (92 - 95)  BP: 142/70 (28 Sep 2023 04:54) (142/70 - 149/70)  BP(mean): --  RR: 18 (28 Sep 2023 04:54) (18 - 18)  SpO2: 92% (28 Sep 2023 04:54) (92% - 94%)    Parameters below as of 28 Sep 2023 04:54  Patient On (Oxygen Delivery Method): room air        PHYSICAL EXAM:    Constitutional: NAD  HEENT: EOMI, throat clear  Neck: No LAD, supple  Respiratory: CTA and P  Cardiovascular: S1 and S2, RRR, no M  Gastrointestinal: BS+, soft, NT/ND, neg HSM,  Extremities: No peripheral edema, neg clubbing, cyanosis  Vascular: 2+ peripheral pulses  Neurological: A/O x 3, no focal deficits  Psychiatric: Normal mood, normal affect  Skin: No rashes      LABS:                        9.8    5.23  )-----------( 225      ( 28 Sep 2023 06:30 )             29.9     09-28    140  |  109<H>  |  15  ----------------------------<  180<H>  4.3   |  25  |  1.20    Ca    8.4<L>      28 Sep 2023 06:30  Mg     2.0     09-28    TPro  6.4  /  Alb  2.9<L>  /  TBili  0.4  /  DBili  x   /  AST  91<H>  /  ALT  122<H>  /  AlkPhos  119  09-28    PT/INR - ( 26 Sep 2023 23:26 )   PT: 10.4 sec;   INR: 0.89 ratio         PTT - ( 26 Sep 2023 23:26 )  PTT:19.6 sec  Urinalysis Basic - ( 28 Sep 2023 06:30 )    Color: x / Appearance: x / SG: x / pH: x  Gluc: 180 mg/dL / Ketone: x  / Bili: x / Urobili: x   Blood: x / Protein: x / Nitrite: x   Leuk Esterase: x / RBC: x / WBC x   Sq Epi: x / Non Sq Epi: x / Bacteria: x        RADIOLOGY & ADDITIONAL TESTS:

## 2023-09-28 NOTE — PROGRESS NOTE ADULT - PROBLEM SELECTOR PLAN 1
Tolerating diet  N/V resolved  Stable for discharge Tolerating diet  N/V resolved  Cultures NTD  Stable for discharge

## 2023-09-28 NOTE — DISCHARGE NOTE NURSING/CASE MANAGEMENT/SOCIAL WORK - PATIENT PORTAL LINK FT
You can access the FollowMyHealth Patient Portal offered by [facility] by registering at the following website: [email or web address]. By joining [facility]’s FollowMyHealth portal, you will also be able to view your health information using other applications (apps) compatible with our system.

## 2023-09-28 NOTE — PROGRESS NOTE ADULT - PROBLEM SELECTOR PLAN 3
Possibly 2/2 to low flow state -- patient was hypotensive upon arrival to ER SBP 80s  Trend enzymes -- improved

## 2023-09-28 NOTE — PROGRESS NOTE ADULT - SUBJECTIVE AND OBJECTIVE BOX
Pt seen and examined at bedside. Patient laying in bed comfortably, NAD. Reports headache a/w light sensitivity this AM. Reports that abdominal pain, nausea and vomiting has resolved. Pt reports passing flatus, and last BM 3 days ago, denies constipation. Reports lightheadedness with attempts to get OOB and with sitting up.   Denies chest pain, palpitations, and shortness of breath.     T(C): 36.9 (09-28-23 @ 04:54), Max: 36.9 (09-28-23 @ 04:54)  HR: 92 (09-28-23 @ 04:54) (70 - 95)  BP: 142/70 (09-28-23 @ 04:54) (110/67 - 149/70)  RR: 18 (09-28-23 @ 04:54) (18 - 19)  SpO2: 92% (09-28-23 @ 04:54) (92% - 98%)  Wt(kg): --    PHYSICAL EXAM:  General: No acute distress, appears comfortable, well-groomed, appears stated age, elderly female  Head, Eyes, Ears, Nose, Throat: Normal cephalic/atraumatic, anicteric, conjunctiva-non injected and moist, vision grossly intact, hearing grossly intact  Abdomen: Scar from prior ccy noted in RUQ; soft, mildly distended, abdomen nontender to palpation; no guarding, no rebound ttp, no osvaldo peritonitic features.  Extremity: No swelling, or open sores, no gross deformities,  good range of motion  Skin: Good color, turgor, texture with no gross lesions, no eruptions, no rashes, no subcutaneous nodules and normal temperature.     I&O's Detail    27 Sep 2023 07:01  -  28 Sep 2023 07:00  --------------------------------------------------------  IN:    Oral Fluid: 420 mL    sodium chloride 0.9%: 1860 mL    Sodium Chloride 0.9% Bolus: 1000 mL  Total IN: 3280 mL    OUT:    Voided (mL): 600 mL  Total OUT: 600 mL    Total NET: 2680 mL          LABS:                        9.8    5.23  )-----------( 225      ( 28 Sep 2023 06:30 )             29.9     09-28    140  |  109<H>  |  15  ----------------------------<  180<H>  4.3   |  25  |  1.20    Ca    8.4<L>      28 Sep 2023 06:30  Mg     2.0     09-28    TPro  6.4  /  Alb  2.9<L>  /  TBili  0.4  /  DBili  x   /  AST  91<H>  /  ALT  122<H>  /  AlkPhos  119  09-28    PT/INR - ( 26 Sep 2023 23:26 )   PT: 10.4 sec;   INR: 0.89 ratio         PTT - ( 26 Sep 2023 23:26 )  PTT:19.6 sec  Urinalysis Basic - ( 28 Sep 2023 06:30 )    Color: x / Appearance: x / SG: x / pH: x  Gluc: 180 mg/dL / Ketone: x  / Bili: x / Urobili: x   Blood: x / Protein: x / Nitrite: x   Leuk Esterase: x / RBC: x / WBC x   Sq Epi: x / Non Sq Epi: x / Bacteria: x        Culture - Blood (collected 27 Sep 2023 01:00)  Source: .Blood Blood-Venous  Preliminary Report (28 Sep 2023 04:01):    No growth at 24 hours    Culture - Blood (collected 27 Sep 2023 01:00)  Source: .Blood Blood-Venous  Preliminary Report (28 Sep 2023 04:01):    No growth at 24 hours      CAPILLARY BLOOD GLUCOSE      POCT Blood Glucose.: 188 mg/dL (28 Sep 2023 08:17)  POCT Blood Glucose.: 214 mg/dL (27 Sep 2023 20:52)  POCT Blood Glucose.: 149 mg/dL (27 Sep 2023 16:36)  POCT Blood Glucose.: 205 mg/dL (27 Sep 2023 11:44)    RADIOLOGY & ADDITIONAL STUDIES:     HPI:  86 y/o female w/ PMH DM p/w abdominal pain x2 days. History partially gathered from pt's grand daughter Afsaneh (Emergency contact) on the phone. Pt states abdominal pain started 2 days ago and was mild at outset. Pt's granddaughter notes that this occasional happens in the past and usually subsides on its own. Pt then endorses increased pain today and vomiting on herself (NBNB emesis) at least five times. Pt endorses pain was severe when she came to the ER and has improved s/p pain medication administration (~3 hours ago). At present, pt now endorses generalized body pain and headache, but less abdominal pain. No diarrhea.  Pt's granddaughter states that appointment to establish care w/ a PCP was made when pt's symptoms first started (Dr. Nadia Venegas) and was supposed to be later today. Pt is originally from [facility] and has been living with her son for the last few months. Patient hypotensive in ER - SBP 80s   (27 Sep 2023 06:06)      MEDICATIONS:  acetaminophen     Tablet .. 650 milliGRAM(s) Oral every 6 hours PRN  aluminum hydroxide/magnesium hydroxide/simethicone Suspension 30 milliLiter(s) Oral every 4 hours PRN  dextrose 5%. 1000 milliLiter(s) IV Continuous <Continuous>  dextrose 5%. 1000 milliLiter(s) IV Continuous <Continuous>  dextrose 50% Injectable 25 Gram(s) IV Push once  dextrose 50% Injectable 25 Gram(s) IV Push once  dextrose 50% Injectable 12.5 Gram(s) IV Push once  dextrose Oral Gel 15 Gram(s) Oral once PRN  glucagon  Injectable 1 milliGRAM(s) IntraMuscular once  heparin   Injectable 5000 Unit(s) SubCutaneous every 12 hours  influenza  Vaccine (HIGH DOSE) 0.7 milliLiter(s) IntraMuscular once  insulin lispro (ADMELOG) corrective regimen sliding scale   SubCutaneous three times a day before meals  insulin lispro (ADMELOG) corrective regimen sliding scale   SubCutaneous at bedtime  melatonin 3 milliGRAM(s) Oral at bedtime PRN  ondansetron Injectable 4 milliGRAM(s) IV Push every 6 hours PRN  sodium chloride 0.9%. 1000 milliLiter(s) IV Continuous <Continuous>      ASSESSMENT/PLAN:  86 y/o female w/ PMH DM p/w abdominal pain, N/V x2 days. CTAP noncontrast reveals "mild nonspecific prominence of small bowel loops, predominantly in the right lower quadrant, probably nonspecific enteritis."  Afebrile. Hypotensive s/p 1 liter NS. Additional liter given.  Labs reveal leukocytosis to 11K. Elevated lactate 3.6, before 2nd fluid bolus.  At time of encounter pt endorses that her abdominal pain, nausea and vomiting has resolved. Abdominal exam reveals a soft and mildy distended abdomen that is not tender to palpation and no guarding or rebound tenderness.    - Admit to medicine, will attempt conservative management of gastroenteritis.  - On DASH/TLC diet   - lactate 3.1 --> 2.4; possible elevated 2/2 low-flow state following repeated vomiting  - GI PCR negative   - IV Fluid resuscitation  - Anti-emetics prn, pain control  - Will follow  - Case to be d/w Dr. Lopez    Surgical Team  Spectralink: [id number] Pt seen and examined at bedside. Patient laying in bed comfortably, NAD.  - ID [id number]. Reports headache a/w light sensitivity this AM. Reports that abdominal pain, nausea and vomiting has resolved. Pt reports passing flatus, and last BM 3 days ago, denies constipation. Reports lightheadedness with attempts to get OOB and with sitting up.   Denies chest pain, palpitations, and shortness of breath.     T(C): 36.9 (09-28-23 @ 04:54), Max: 36.9 (09-28-23 @ 04:54)  HR: 92 (09-28-23 @ 04:54) (70 - 95)  BP: 142/70 (09-28-23 @ 04:54) (110/67 - 149/70)  RR: 18 (09-28-23 @ 04:54) (18 - 19)  SpO2: 92% (09-28-23 @ 04:54) (92% - 98%)  Wt(kg): --    PHYSICAL EXAM:  General: No acute distress, appears comfortable, well-groomed, appears stated age, elderly female  Head, Eyes, Ears, Nose, Throat: Normal cephalic/atraumatic, anicteric, conjunctiva-non injected and moist, vision grossly intact, hearing grossly intact  Abdomen: Scar from prior ccy noted in RUQ; soft, mildly distended, abdomen nontender to palpation; no guarding, no rebound ttp, no osvaldo peritonitic features.  Extremity: No swelling, or open sores, no gross deformities,  good range of motion  Skin: Good color, turgor, texture with no gross lesions, no eruptions, no rashes, no subcutaneous nodules and normal temperature.     I&O's Detail    27 Sep 2023 07:01  -  28 Sep 2023 07:00  --------------------------------------------------------  IN:    Oral Fluid: 420 mL    sodium chloride 0.9%: 1860 mL    Sodium Chloride 0.9% Bolus: 1000 mL  Total IN: 3280 mL    OUT:    Voided (mL): 600 mL  Total OUT: 600 mL    Total NET: 2680 mL          LABS:                        9.8    5.23  )-----------( 225      ( 28 Sep 2023 06:30 )             29.9     09-28    140  |  109<H>  |  15  ----------------------------<  180<H>  4.3   |  25  |  1.20    Ca    8.4<L>      28 Sep 2023 06:30  Mg     2.0     09-28    TPro  6.4  /  Alb  2.9<L>  /  TBili  0.4  /  DBili  x   /  AST  91<H>  /  ALT  122<H>  /  AlkPhos  119  09-28    PT/INR - ( 26 Sep 2023 23:26 )   PT: 10.4 sec;   INR: 0.89 ratio         PTT - ( 26 Sep 2023 23:26 )  PTT:19.6 sec  Urinalysis Basic - ( 28 Sep 2023 06:30 )    Color: x / Appearance: x / SG: x / pH: x  Gluc: 180 mg/dL / Ketone: x  / Bili: x / Urobili: x   Blood: x / Protein: x / Nitrite: x   Leuk Esterase: x / RBC: x / WBC x   Sq Epi: x / Non Sq Epi: x / Bacteria: x        Culture - Blood (collected 27 Sep 2023 01:00)  Source: .Blood Blood-Venous  Preliminary Report (28 Sep 2023 04:01):    No growth at 24 hours    Culture - Blood (collected 27 Sep 2023 01:00)  Source: .Blood Blood-Venous  Preliminary Report (28 Sep 2023 04:01):    No growth at 24 hours      CAPILLARY BLOOD GLUCOSE      POCT Blood Glucose.: 188 mg/dL (28 Sep 2023 08:17)  POCT Blood Glucose.: 214 mg/dL (27 Sep 2023 20:52)  POCT Blood Glucose.: 149 mg/dL (27 Sep 2023 16:36)  POCT Blood Glucose.: 205 mg/dL (27 Sep 2023 11:44)    RADIOLOGY & ADDITIONAL STUDIES:     HPI:  86 y/o female w/ PMH DM p/w abdominal pain x2 days. History partially gathered from pt's grand daughter Afsaneh (Emergency contact) on the phone. Pt states abdominal pain started 2 days ago and was mild at outset. Pt's granddaughter notes that this occasional happens in the past and usually subsides on its own. Pt then endorses increased pain today and vomiting on herself (NBNB emesis) at least five times. Pt endorses pain was severe when she came to the ER and has improved s/p pain medication administration (~3 hours ago). At present, pt now endorses generalized body pain and headache, but less abdominal pain. No diarrhea.  Pt's granddaughter states that appointment to establish care w/ a PCP was made when pt's symptoms first started (Dr. Nadia Venegas) and was supposed to be later today. Pt is originally from [facility] and has been living with her son for the last few months. Patient hypotensive in ER - SBP 80s   (27 Sep 2023 06:06)      MEDICATIONS:  acetaminophen     Tablet .. 650 milliGRAM(s) Oral every 6 hours PRN  aluminum hydroxide/magnesium hydroxide/simethicone Suspension 30 milliLiter(s) Oral every 4 hours PRN  dextrose 5%. 1000 milliLiter(s) IV Continuous <Continuous>  dextrose 5%. 1000 milliLiter(s) IV Continuous <Continuous>  dextrose 50% Injectable 25 Gram(s) IV Push once  dextrose 50% Injectable 25 Gram(s) IV Push once  dextrose 50% Injectable 12.5 Gram(s) IV Push once  dextrose Oral Gel 15 Gram(s) Oral once PRN  glucagon  Injectable 1 milliGRAM(s) IntraMuscular once  heparin   Injectable 5000 Unit(s) SubCutaneous every 12 hours  influenza  Vaccine (HIGH DOSE) 0.7 milliLiter(s) IntraMuscular once  insulin lispro (ADMELOG) corrective regimen sliding scale   SubCutaneous three times a day before meals  insulin lispro (ADMELOG) corrective regimen sliding scale   SubCutaneous at bedtime  melatonin 3 milliGRAM(s) Oral at bedtime PRN  ondansetron Injectable 4 milliGRAM(s) IV Push every 6 hours PRN  sodium chloride 0.9%. 1000 milliLiter(s) IV Continuous <Continuous>      ASSESSMENT/PLAN:  86 y/o female w/ PMH DM p/w abdominal pain, N/V x2 days. CTAP noncontrast reveals "mild nonspecific prominence of small bowel loops, predominantly in the right lower quadrant, probably nonspecific enteritis."  Afebrile. Hypotensive s/p 1 liter NS. Additional liter given.  Labs reveal leukocytosis to 11K. Elevated lactate 3.6, before 2nd fluid bolus.  At time of encounter pt endorses that her abdominal pain, nausea and vomiting has resolved. Abdominal exam reveals a soft and mildy distended abdomen that is not tender to palpation and no guarding or rebound tenderness.    - Admit to medicine, will attempt conservative management of gastroenteritis.  - On DASH/TLC diet   - lactate 3.1 --> 2.4; possible elevated 2/2 low-flow state following repeated vomiting  - GI PCR negative   - IV Fluid resuscitation  - Anti-emetics prn, pain control  - Will follow  - Case to be d/w Dr. Lopez    Surgical Team  Spectralink: [id number]

## 2023-09-28 NOTE — DISCHARGE NOTE PROVIDER - NSDCCPCAREPLAN_GEN_ALL_CORE_FT
PRINCIPAL DISCHARGE DIAGNOSIS  Diagnosis: Enteritis  Assessment and Plan of Treatment: Follow-up with your primary care doctor within 1 week.        SECONDARY DISCHARGE DIAGNOSES  Diagnosis: Essential hypertension  Assessment and Plan of Treatment: Continue current home medications  Follow-up with your primary care doctor within 1 week.      Diagnosis: Diabetes mellitus, type 2  Assessment and Plan of Treatment: Follow-up with your primary care doctor within 1 week.   Continue current home medications

## 2023-09-28 NOTE — CASE MANAGEMENT PROGRESS NOTE - NSCMPROGRESSNOTE_GEN_ALL_CORE
Patient cleared for DC today. S/w dtr she will transport pt home. No needs identified for DC. CM to remain available

## 2023-09-28 NOTE — DISCHARGE NOTE PROVIDER - HOSPITAL COURSE
86 y/o female w/ PMH DM p/w abdominal pain x2 days. History partially gathered from pt's grand daughter Afsaneh (Emergency contact) on the phone. Pt states abdominal pain started 2 days ago and was mild at outset. Pt's granddaughter notes that this occasional happens in the past and usually subsides on its own. Pt then endorses increased pain today and vomiting on herself (NBNB emesis) at least five times. Pt endorses pain was severe when she came to the ER and has improved s/p pain medication administration (~3 hours ago). At present, pt now endorses generalized body pain and headache, but less abdominal pain. No diarrhea.  Pt's granddaughter states that appointment to establish care w/ a PCP was made when pt's symptoms first started (Dr. Nadia Venegas) and was supposed to be later today. Pt is originally from [facility] and has been living with her son for the last few months. Patient hypotensive in ER - SBP 80s.    Patient came with intractable n/v  Found to have enteritis on CT and with RAQUEL 2/2 dehydration  Improved with conservative management of IVF and zofran prn  Tolerating diet    >35 minutes spent on discharge

## 2023-09-28 NOTE — PROGRESS NOTE ADULT - ASSESSMENT
enteritis  elevated lfts    abrupt rise in lfts now improving   ? related to antibiotics  observe off antibiotics  GI pcr  diet as tolerated  will follow     I reviewed the overnight course of events on the unit, re-confirming the patient history. I discussed the care with the patient and their family. The plan of care was discussed with the physician assistant and modifications were made to the notation where appropriate. Differential diagnosis and plan of care discussed with patient after the evaluation. Advanced care planning was discussed with patient and family.  Advanced care planning forms were reviewed and discussed.  Risks, benefits and alternatives of gastroenterologic procedures were discussed in detail and all questions were answered. 35 minutes spent on total encounter of which more than fifty percent of the encounter was spent counseling and/or coordinating care by the attending physician.

## 2023-10-02 LAB
CULTURE RESULTS: SIGNIFICANT CHANGE UP
CULTURE RESULTS: SIGNIFICANT CHANGE UP
SPECIMEN SOURCE: SIGNIFICANT CHANGE UP
SPECIMEN SOURCE: SIGNIFICANT CHANGE UP

## 2024-03-08 ENCOUNTER — EMERGENCY (EMERGENCY)
Facility: HOSPITAL | Age: 89
LOS: 1 days | Discharge: HOME | End: 2024-03-08
Attending: EMERGENCY MEDICINE | Admitting: STUDENT IN AN ORGANIZED HEALTH CARE EDUCATION/TRAINING PROGRAM
Payer: COMMERCIAL

## 2024-03-08 VITALS
DIASTOLIC BLOOD PRESSURE: 75 MMHG | RESPIRATION RATE: 18 BRPM | TEMPERATURE: 98 F | SYSTOLIC BLOOD PRESSURE: 130 MMHG | HEART RATE: 90 BPM | OXYGEN SATURATION: 98 %

## 2024-03-08 VITALS
HEIGHT: 60 IN | HEART RATE: 94 BPM | OXYGEN SATURATION: 100 % | SYSTOLIC BLOOD PRESSURE: 128 MMHG | DIASTOLIC BLOOD PRESSURE: 70 MMHG | RESPIRATION RATE: 19 BRPM | WEIGHT: 134.92 LBS | TEMPERATURE: 98 F

## 2024-03-08 DIAGNOSIS — Z90.49 ACQUIRED ABSENCE OF OTHER SPECIFIED PARTS OF DIGESTIVE TRACT: Chronic | ICD-10-CM

## 2024-03-08 PROBLEM — E11.9 TYPE 2 DIABETES MELLITUS WITHOUT COMPLICATIONS: Chronic | Status: ACTIVE | Noted: 2023-09-27

## 2024-03-08 PROBLEM — E78.5 HYPERLIPIDEMIA, UNSPECIFIED: Chronic | Status: ACTIVE | Noted: 2023-09-27

## 2024-03-08 PROCEDURE — 90715 TDAP VACCINE 7 YRS/> IM: CPT

## 2024-03-08 PROCEDURE — 99285 EMERGENCY DEPT VISIT HI MDM: CPT

## 2024-03-08 PROCEDURE — 73562 X-RAY EXAM OF KNEE 3: CPT | Mod: 26,RT

## 2024-03-08 PROCEDURE — 73090 X-RAY EXAM OF FOREARM: CPT

## 2024-03-08 PROCEDURE — 72125 CT NECK SPINE W/O DYE: CPT | Mod: MC

## 2024-03-08 PROCEDURE — 71250 CT THORAX DX C-: CPT | Mod: MC

## 2024-03-08 PROCEDURE — 72125 CT NECK SPINE W/O DYE: CPT | Mod: 26,MC

## 2024-03-08 PROCEDURE — 73090 X-RAY EXAM OF FOREARM: CPT | Mod: 26,RT

## 2024-03-08 PROCEDURE — 90471 IMMUNIZATION ADMIN: CPT

## 2024-03-08 PROCEDURE — 73562 X-RAY EXAM OF KNEE 3: CPT

## 2024-03-08 PROCEDURE — 99284 EMERGENCY DEPT VISIT MOD MDM: CPT | Mod: 25

## 2024-03-08 PROCEDURE — 70450 CT HEAD/BRAIN W/O DYE: CPT | Mod: 26,MC

## 2024-03-08 PROCEDURE — 71250 CT THORAX DX C-: CPT | Mod: 26,MC

## 2024-03-08 PROCEDURE — 70450 CT HEAD/BRAIN W/O DYE: CPT | Mod: MC

## 2024-03-08 RX ORDER — TETANUS TOXOID, REDUCED DIPHTHERIA TOXOID AND ACELLULAR PERTUSSIS VACCINE, ADSORBED 5; 2.5; 8; 8; 2.5 [IU]/.5ML; [IU]/.5ML; UG/.5ML; UG/.5ML; UG/.5ML
0.5 SUSPENSION INTRAMUSCULAR ONCE
Refills: 0 | Status: COMPLETED | OUTPATIENT
Start: 2024-03-08 | End: 2024-03-08

## 2024-03-08 RX ORDER — ACETAMINOPHEN 500 MG
650 TABLET ORAL ONCE
Refills: 0 | Status: COMPLETED | OUTPATIENT
Start: 2024-03-08 | End: 2024-03-08

## 2024-03-08 RX ADMIN — TETANUS TOXOID, REDUCED DIPHTHERIA TOXOID AND ACELLULAR PERTUSSIS VACCINE, ADSORBED 0.5 MILLILITER(S): 5; 2.5; 8; 8; 2.5 SUSPENSION INTRAMUSCULAR at 16:20

## 2024-03-08 RX ADMIN — Medication 650 MILLIGRAM(S): at 16:20

## 2024-03-08 NOTE — ED PROVIDER NOTE - DIFFERENTIAL DIAGNOSIS
Differential Diagnosis Differential including but not limited to ICH fracture dislocation sprain abrasion/skin tear

## 2024-03-08 NOTE — ED PROVIDER NOTE - CROS ED NEURO ALL NEG
History of Present Illness:  Ángel is a 33 year old female,      , seen today for annual gyn exam.  She is not having problems.Her  had a vasectomy. There is no cancer history in her family.     Pap: 2019 negative pap and HPV    I have reviewed the patient's vital signs, medications and allergies, past medical, surgical, social and family history, updating these as appropriate.  See Histories section of the EMR for a display of this information.      REVIEW OF SYSTEMS:  CONSTITUTIONAL: Denies fever, fatigue or unintentional weight loss.  NEUROLOGIC:  Denies frequent headaches, weakness, numbness.    PSYCHIATRIC: Denies significant anxiety or depression, insomnia, suicidal thoughts.  CARDIAC:  No chest pain or palpitations.   RESPIRATORY: Denies shortness of breath, chronic cough.   GI:  Denies nausea, vomiting, constipation, diarrhea, or melena.    : Denies dysuria, significant frequency, urgency, or nocturia,  itching, or burning.  MUSCULOSKELETAL: Denies severe muscle or joint pain.  SKIN: No rashes or change in skin lesions.  BREAST: Denies breast lump, nipple discharge, breast pain.    PHYSICAL EXAM:  /70 (BP Location: LUE - Left upper extremity, Patient Position: Sitting, Cuff Size: Regular)   Pulse 68   Ht 5' 7\" (1.702 m)   Wt 76.2 kg (168 lb)   LMP 2022 (Approximate)   General: well developed, well nourished, in no acute distress  Psychiatric: Alert, oriented, with appropriate affect  Neck: no masses or thyromegaly  Breasts: symmetric, no masses or nipple discharge bilaterally  Lungs: clear bilaterally to auscultation, respiratory effort is normal  Heart: regular rate and rhythm, without murmurs  Abdomen: abdomen soft and non-tender without masses, hepatosplenomegaly or hernias noted  Neurologic: no focal deficits   Skin: No lesions or rashes  Lymphatics: No cervical, axillary, or inguinal adenopathy  Extremities: No significant varicosities or edema    Pelvic  Exam:  External genitalia: normal appearance, no lesions   Urethra/Urethral Meatus: normal  Bladder: no mass, nontender  Vagina: normal appearing without lesions. No cystocele or rectocele.  Cervix: normal appearance, no lesions or inflammation  Uterus: normal size, mobile, non-tender, no masses  Adnexa: no masses, non-tender  Rectal: normal external exam      ASSESSMENT:    1. Well woman exam      Recent PHQ 2/9 Score    PHQ 2:  Date Adult PHQ 2 Score Adult PHQ 2 Interpretation   4/6/2022 0 No further screening needed       PHQ 9:     DEPRESSION ASSESSMENT/PLAN:  Depression screening is negative no further plan needed.     PLAN:  We reviewed healthy diet, importance of exercise and weight maintenance, the importance of adequate calcium and Vitamin D3, ACS and USPTF mammography recommendations, ASCCP pap recommendations, avoiding excessive sun exposure, and other safety recommendations.  Return in about 1 year (around 4/6/2023).              - - -

## 2024-03-08 NOTE — ED PROVIDER NOTE - OBJECTIVE STATEMENT
#[id number]  Patient brought in by daughter for injuries status post fall on steps.  Patient was walking next to stairs down to the basement when she slipped and fell onto the stairs.  Family relates she fell approximately 6 stairs.  Patient complaining of headache rib pain right forearm pain right knee pain and multiple skin tears.  Unsure last tetanus.  No LOC dizziness nausea vomiting neck pain back pain shortness of breath weakness numbness.  Patient was able to ambulate after fall  PMD Nadia Venegas

## 2024-03-08 NOTE — ED PROVIDER NOTE - CLINICAL SUMMARY MEDICAL DECISION MAKING FREE TEXT BOX
#[id number]  Patient brought in by daughter for injuries status post fall on steps.  Patient was walking next to stairs down to the basement when she slipped and fell onto the stairs.  Family relates she fell approximately 6 stairs.  Patient complaining of headache rib pain right forearm pain right knee pain and multiple skin tears.  Unsure last tetanus.  No LOC dizziness nausea vomiting neck pain back pain shortness of breath weakness numbness.  Patient was able to ambulate after fall  PMD Nadia Venegas    Plan CT head C-spine chest x-ray right forearm right knee update tetanus Tylenol for pain    Differential including but not limited to ICH fracture dislocation sprain abrasion/skin tear

## 2024-03-08 NOTE — ED PROVIDER NOTE - PROGRESS NOTE DETAILS
d/w ortho pa, will see pt Celestine Lewis MD (Attending Physician): Ortho PA evaluated pt. Recommending pain control PRN. WBAT with knee immobilizer, may remove to shower. No active or passive flexion. F/u as outpatient. Soft C-collar for comfort only. Pt was re-evaluated at bedside, VSS, feeling better overall. Results were discussed with patient and her family as well as return precautions and follow up plan with ortho. Time was taken to answer any questions that the patient had before providing them with discharge paperwork.

## 2024-03-08 NOTE — CONSULT NOTE ADULT - SUBJECTIVE AND OBJECTIVE BOX
HPI:  This is a 89 y/o F  seen in the ED for non-displaced right patella Fx and likely chronic C7 compression Fx.     PAST MEDICAL HISTORY:  PAST MEDICAL & SURGICAL HISTORY:  DM (diabetes mellitus)      HLD (hyperlipidemia)      History of cholecystectomy          PAST SURGICAL HISTORY:    REVIEW OF SYSTEMS:  General/Constitutional: No acute distress, no headache, weakness, fevers, or chills   HEENT: Denies auditory or visual changes/disturbances, no vertigo, no throat pain, no dysphagia    Neck: Denies neck pain/stiffness, denies swelling/lumps/hoarseness   Lymphatic: Denies lumps or swelling in the axillae, groin, or neck bilaterally   Respiratory: Denies cough/hemoptysis, denies wheezing/SOB/dyspnea  Cardiac: Denies chest pain, palpitations  Abdomen: Denies abdominal bloating/fullness, nausea or vomiting, denies abdominal pain  Genitourinary: Denies urinary issues or complaints, denies dysuria/hematuria  Neuro: Denies weakness, paraesthesias, paralysis, syncope.  Skin: Denies pruritus, pain, rashes  Psych: Denies hallucinations, visual disturbances, or depression    MEDICATIONS:  Home Medications:    MEDICATIONS  (STANDING):    MEDICATIONS  (PRN):      ALLERGIES:  Allergies    No Known Allergies    Intolerances        SOCIAL HISTORY:  Social History:    Smoking: Yes [ ]  No [ X   ______pk yrs  ETOH  Yes [ ]  No [X]  Social [ ]  DRUGS:  Yes [ ]  No [X]  if so what______________    FAMILY HISTORY:  FAMILY HISTORY:      VITAL SIGNS:  Vital Signs Last 24 Hrs  T(C): 36.6 (08 Mar 2024 15:19), Max: 36.6 (08 Mar 2024 15:19)  T(F): 97.9 (08 Mar 2024 15:19), Max: 97.9 (08 Mar 2024 15:19)  HR: 94 (08 Mar 2024 15:19) (94 - 94)  BP: 128/70 (08 Mar 2024 15:19) (128/70 - 128/70)  BP(mean): --  RR: 19 (08 Mar 2024 15:19) (19 - 19)  SpO2: 100% (08 Mar 2024 15:19) (100% - 100%)    Parameters below as of 08 Mar 2024 15:19  Patient On (Oxygen Delivery Method): room air        PHYSICAL EXAM:  AAO x3. No acute distress, appears comfortable, well-groomed, appears stated age  Palpable pedal pulses bilaterally  Sensory intact throughout ulnar, radial and median nerve distributions, bilaterally  Sensory intact throughout bilat LE's  Bilat UE motor intact, 5/5 strength   Bilat LE motor intact, 5/5 strength  able to SLR  decreased right knee flexion due to pain.     RADIOLOGIC STUDIES: Non-displaced right patella Fx    ASSESSMENT: non-displaced right patella Fx    PLAN:  -Pain control PRN  -WBAT with knee immobilizer  -may remove to shower   -No active or passive flexion  -f/u outpatient  -soft C-collar for comfort only

## 2024-03-08 NOTE — ED PROVIDER NOTE - PATIENT PORTAL LINK FT
You can access the FollowMyHealth Patient Portal offered by [facility] by registering at the following website: [email or web address]. By joining [facility]’s FollowMyHealth portal, you will also be able to view your health information using other applications (apps) compatible with our system.

## 2024-03-08 NOTE — ED PROVIDER NOTE - CARE PLAN
Principal Discharge DX:	Patellar fracture  Secondary Diagnosis:	Compression fracture of cervical spine   1

## 2024-03-08 NOTE — ED ADULT TRIAGE NOTE - CHIEF COMPLAINT QUOTE
s/p fall , skin tears on L leg . pt fell down 6 stairs.  Denies LOC. pt did hit her head, no hematoma bruising noted. denies blood thinners. Hx DM2

## 2024-03-08 NOTE — ED ADULT NURSE NOTE - NSFALLRISKASMT_ED_ALL_ED_DT
Pt to ED with fall, pulled by dog fell hit deck. RIGHT forehead lac approx 8cm. Bleeding thru towel from home and dressing here. Pressure dressing appied in triage. No thinner. No LOC. Denies neck pain  
08-Mar-2024 16:29

## 2024-03-08 NOTE — ED PROVIDER NOTE - IV ALTEPLASE EXCL REL HIDDEN
Ambulatory Care Coordination  ED Follow up Call    Reason for ED visit:  FALL  Status:     improved    Did you call your PCP prior to going to the ED? No      Did you receive a discharge instructions from the Emergency Room? Yes  Review of Instructions:     Understands what to report/when to return?:  Yes   Understands discharge instructions?:  Yes   Following discharge instructions?:  Yes   If not why? N/A    Are there any new complaints of pain? No  New Pain Meds? No    Constipation prophylaxis needed? N/A    If you have a wound is the dressing clean, dry, and intact? N/A  Understands wound care regimen? N/A    Are there any other complaints/concerns that you wish to tell your provider? Patient went to ED after fall. Was discovered she has a UTI. Treating with Bactrim. Patient also noted that ED physician noted her HR is quite low. Patient noted it has been like that for some time even dropping into the 30's. ED physician discussed with patient medication change from metoprolol and advised patient to discuss with PCP. ACM discussed with patient asking if she was symptomatic on metoprolol. Patient noted she has been on it for years and does not note any side effects other then slow HR. Patient was advised to monitor over the next few days until her appointment and have it for her PCP to review with her. ACM asked if patient was able to take BP at home, patient notes she is not but her daughter is a nurse and can monitor for her. Patient does use pulse ox daily to check her oxygen and HR. Patient was advised to stand up slowly and make sure she is not dizzy before walking to prevent falls. Also advised if she feels unsteady to sit down. Patient verbalized understanding. Patient was instructed to take abx as directed and until completed. Patient was advised to call the office is symptoms worsen. Patient verbalized understanding.    Patient denies the need for additional follow up calls from [facility] noting she will call PCP office if needed. Plan   No further ACM outreach        FU appts/Provider:    Future Appointments   Date Time Provider Dayami Morton   10/4/2022 11:20 AM DO BUFFY Fuentes [place] - DYD   10/5/2022  3:00 PM Siri Hart MD [facility]           New Medications?:   Yes      Medication Reconciliation by phone - Yes  Understands Medications? Yes  Taking Medications? Yes  Can you swallow your pills? Yes    Any further needs in the home i.e. Equipment?   No    Link to services in community?:  N/A   Which services:  N/A show

## 2024-03-08 NOTE — ED PROVIDER NOTE - CARE PROVIDER_API CALL
Jayme Escamilla  Orthopaedic Surgery  [address], [address]  [place] [address]  Phone: [phone]  Fax: [phone]  Follow Up Time: 4-6 Days

## 2024-03-08 NOTE — ED ADULT NURSE NOTE - NSFALLHARMRISKINTERV_ED_ALL_ED

## 2024-03-08 NOTE — ED PROVIDER NOTE - NSFOLLOWUPINSTRUCTIONS_ED_ALL_ED_FT
Please follow-up with an orthopedic surgeon within a week. Their contact information is attached below. Please call to schedule an appointment.    You can use 500-1000mg Tylenol every 6 hours for pain - as needed.  This is an over-the-counter medications - please respect the warnings on the label. This medication come with certain risks and side effects that you need to discuss with your doctor, especially if you are taking it for a prolonged period.    You can use 400-600mg Ibuprofen (such as motrin or advil) every 6 to 8 hours as needed for pain control.  Take ibuprofen with food or milk to lessen stomach upset.  This is an over-the-counter medication please respect the warnings on the label. All medications come with certain risks and side effects that you need to discuss with your doctor, especially if you are taking them for a prolonged period.    You can bear weight as tolerated with the knee immobilizer.    You may remove the knee immobilizer to shower.    No active or passive flexion.    You can wear a soft C-collar for comfort.

## 2024-03-08 NOTE — ED ADULT NURSE NOTE - OBJECTIVE STATEMENT
Received the patient in the Er. Patient is alert and oriented. s/p fall  down 6 stairs.  Denies LOC.  denies blood thinners. Skin tears on left leg and right arm. Able to move all extremities.

## 2024-09-01 NOTE — ED ADULT NURSE NOTE - HOW OFTEN DO YOU HAVE A DRINK CONTAINING ALCOHOL?
Progress Note      SUBJECTIVE       Needing pain meds and therefore more lethargic.   Right arm more edematous than before  Cont liquid bms in the rectal tube        Inpatient Meds  Current Facility-Administered Medications   Medication Dose Route Frequency Provider Last Rate Last Admin    [START ON 9/2/2024] apixaBAN (ELIQUIS) tablet 5 mg  5 mg Per PEG Tube 2 times per day Josie Mayberry MD        [Held by provider] sodium zirconium cyclosilicate (LOKELMA) packet 10 g  10 g Per PEG Tube Daily Ambar Zhang MD   10 g at 09/01/24 0838    sodium chloride 0.9% infusion   Intravenous Continuous PRN Zeferino Wyatt MD        acetaminophen (TYLENOL) tablet 1,000 mg  1,000 mg Per PEG Tube 2 times per day Zeferino Wyatt MD   1,000 mg at 09/01/24 0837    morphine injection 2 mg  2 mg Intravenous Q2H PRN Zeferino Wyatt MD   2 mg at 09/01/24 1803    oxyCODONE (IMM REL) (ROXICODONE) tablet 10 mg  10 mg Oral Q4H PRN Zeferino Wyatt MD        polyethylene glycol (MIRALAX) packet 17 g  17 g Per PEG Tube Daily PRN Zeferino Wyatt MD        heparin (porcine) injection 5,000 Units  5,000 Units Subcutaneous 2 times per day Josie Mayberry MD   5,000 Units at 09/01/24 0834    vancomycin (FIRVANQ) 250 MG/5ML solution 125 mg  125 mg Per PEG Tube Nightly Ambar Reilly MD   125 mg at 08/31/24 2209    [Held by provider] furosemide (LASIX INJECT) injection 40 mg  40 mg Intravenous Daily Ambar Zhang MD        metoPROLOL tartrate (LOPRESSOR) tablet 25 mg  25 mg Per PEG Tube 2 times per day Lynda Louie MD   25 mg at 09/01/24 0837    sodium chloride (NORMAL SALINE) 0.9 % bolus 100-200 mL  100-200 mL Intravenous PRN Ambar Zhang MD        ceftoloZANE-tazobactam (ZERBAXA) 450 mg in sodium chloride 0.9 % 100 mL IVPB  450 mg Intravenous 3 times per day Ambar Reilly  mL/hr at 09/01/24 1447 450 mg at 09/01/24 1447    midodrine (PROAMATINE) tablet 10 mg  10 mg Per PEG Tube TID  PRN Carolynn Argueta MD   10 mg at 09/01/24 1354    ondansetron (ZOFRAN) injection 4 mg  4 mg Intravenous Q6H PRN Carolynn Argueta MD   4 mg at 08/18/24 1322    sucralfate (CARAFATE) 1 GM/10ML suspension 1 g  1 g Per PEG Tube 4x Daily AC & HS Carolynn Argueta MD   1 g at 09/01/24 1700    insulin lispro (ADMELOG,HumaLOG) - Correction Dose   Subcutaneous 4 times per day Carolynn Argueta MD   2 Units at 09/01/24 0046    insulin lispro (ADMELOG, HumaLOG) injection 2 Units  2 Units Subcutaneous 4 times per day Carolynn Argueta MD   2 Units at 09/01/24 1803    TACROlimus (PROGRAF) 0.5 MG/ML (compounded) suspension 1.5 mg  1.5 mg Per PEG Tube Daily Tx Russ Hidalgo MD   1.5 mg at 09/01/24 0838    TACROlimus (PROGRAF) 0.5 MG/ML (compounded) suspension 1.5 mg  1.5 mg Per PEG Tube Nightly Tx Russ Hidalgo MD   1.5 mg at 09/01/24 1802    midodrine (PROAMATINE) tablet 10 mg  10 mg Per PEG Tube Once per day on Tuesday Thursday Saturday Kj Sung L, NP   10 mg at 08/31/24 0821    povidone-iodine (BETADINE) 10 % ointment   Topical BID Vale England MD   Given at 09/01/24 0857    citric acid/sodium citrate (BICITRA) solution 30 mL  30 mL Per PEG Tube TID Juwan Gilliam MD   30 mL at 09/01/24 1355    dextrose (GLUTOSE) 40 % gel 15 g  15 g Per PEG Tube PRN Juwan Gilliam MD        dextrose (GLUTOSE) 40 % gel 30 g  30 g Per PEG Tube PRN Juwan Gilliam MD        insulin glargine (LANTUS) injection 6 Units  6 Units Subcutaneous Daily Juwan Gilliam MD   6 Units at 09/01/24 0848    LORazepam (ATIVAN) tablet 1 mg  1 mg Per PEG Tube Q4H PRN Susie De Jesus MD   1 mg at 08/30/24 2127    sodium citrate anticoagulant 4 % flush 3 mL  3 mL Intracatheter PRN Leilani Lemon MD        alteplase (CATHFLO ACTIVASE) injection 2 mg  2 mg Intracatheter PRN Leilani Lemon MD        sodium chloride (NORMAL SALINE) 0.9 % bolus 100-200 mL  100-200 mL Intravenous PRN Leilani Lemon MD         sodium chloride 0.9 % injection 10 mL  10 mL Injection PRN Afia Koenig MD   10 mL at 08/20/24 2057    sodium chloride 0.9 % injection 10 mL  10 mL Injection 2 times per day Afia Koenig MD   10 mL at 09/01/24 0855    sodium chloride 0.9 % injection 10 mL  10 mL Injection 2 times per day Afia Koenig MD   10 mL at 09/01/24 0839    sodium citrate anticoagulant 4 % flush 3 mL  3 mL Intracatheter PRN Leilani Lemon MD        sodium citrate anticoagulant 4 % flush 3 mL  3 mL Intracatheter PRN Leilani Lemon MD        HYDROmorphone (DILAUDID) injection 0.5 mg  0.5 mg Intravenous Q4H PRN Kj Sung L, NP   0.5 mg at 09/01/24 0835    ipratropium-albuterol (DUONEB) 0.5-2.5 (3) MG/3ML nebulizer solution 3 mL  3 mL Nebulization Q6H Resp Kj Sung, NP   3 mL at 09/01/24 1359    sodium hypochlorite (DAKINS) 0.125 % (1/4 strength) irrigation solution   Topical 2 times per day Betty Torres MD   Given at 09/01/24 0856    mirtazapine (REMERON) tablet 7.5 mg  7.5 mg Per PEG Tube Nightly Mohsin, Sana, DO   7.5 mg at 08/31/24 2212    dextrose 50 % injection 25 g  25 g Intravenous PRN Betty Torres MD   25 g at 07/27/24 0534    dextrose 50 % injection 12.5 g  12.5 g Intravenous PRN Betty Torres MD   12.5 g at 08/16/24 2144    glucagon (GLUCAGEN) injection 1 mg  1 mg Intramuscular PRN Betty Torres MD        lacosamide ORAL (VIMPAT) 10 MG/ML oral solution 150 mg  150 mg Per PEG Tube 2 times per day Mohsin, Sana, DO   150 mg at 09/01/24 0838    predniSONE (DELTASONE) tablet 5 mg  5 mg Per PEG Tube Daily Mohsin, Sana, DO   5 mg at 09/01/24 0835    [Held by provider] tamsulosin (FLOMAX) capsule 0.8 mg  0.8 mg Per PEG Tube Daily PC Mohsin, Sana, DO   0.8 mg at 07/21/24 0800    sodium chloride 0.9 % flush bag 25 mL  25 mL Intravenous PRN Mohsin, Sana, DO 5 mL/hr at 08/25/24 1353 25 mL at 08/25/24 1353    sodium chloride (NORMAL SALINE) 0.9 % bolus 500 mL  500 mL  Intravenous PRN Mohsin, Alisha, DO        acetaminophen (TYLENOL) tablet 650 mg  650 mg Per PEG Tube Q4H PRN Mohsin, Alisha, DO   650 mg at 08/28/24 2138    Or    acetaminophen (TYLENOL) suppository 650 mg  650 mg Rectal Q4H PRN Mohsin, Alisha, DO             OBJECTIVE     VITAL SIGNS:     Vital Last Value 24 Hour Range   Temperature 98.7 °F (37.1 °C) (09/01/24 1200) Temp  Min: 97 °F (36.1 °C)  Max: 98.7 °F (37.1 °C)   Pulse 91 (09/01/24 1545) Pulse  Min: 71  Max: 91   Respiratory 18 (09/01/24 1545) Resp  Min: 16  Max: 41   Non-Invasive  Blood Pressure 121/67 (09/01/24 1545) BP  Min: 83/51  Max: 134/73   Pulse Oximetry 100 % (09/01/24 1545) SpO2  Min: 99 %  Max: 100 %       IINTAKE/OUTPUT:  I/O last 3 completed shifts:  In: 795 [NG/GT:795]  Out: 625 [Urine:625]  No intake/output data recorded.    Intake/Output Summary (Last 24 hours) at 9/1/2024 1813  Last data filed at 9/1/2024 1200  Gross per 24 hour   Intake 795 ml   Output 625 ml   Net 170 ml       PHYSICAL EXAM    General: Awake but tired  HEENT: Negative, supple, no jvp, No lymphadenopathy, no carotid bruit, trach present   Eye: EOMFI, normal conjunctiva, vision unchanged  Cardiovascular: Regular rate and rhythm, no murmur, normal peripheral perfusion, no edema  Respiratory: lungs are clear to auscultation  Chest: no anatomical abnormalities noted, no localizing tenderness  Gastrointestinal: tender on palpation   Musculoskeletal: nontender, right lower extremity above-the-knee amputation, stump covered with dressing clean dry and intact.  Bilateral upper extremity digital gangrene with prior amputations with persistent gangrenous ulcers to the distal left hand digits. RUE w +1 pitting edema  Neuro:Resting  Psych: cooperative  Skin: dry and warm    LABS    Recent Results (from the past 24 hour(s))   GLUCOSE, BEDSIDE - POINT OF CARE    Collection Time: 08/31/24 11:32 PM    Specimen: Blood, Capillary   Result Value Ref Range    GLUCOSE, BEDSIDE - POINT OF CARE 161 (H)  70 - 99 mg/dL   GLUCOSE, BEDSIDE - POINT OF CARE    Collection Time: 09/01/24  4:49 AM    Specimen: Blood, Capillary   Result Value Ref Range    GLUCOSE, BEDSIDE - POINT OF CARE 134 (H) 70 - 99 mg/dL   Tacrolimus    Collection Time: 09/01/24  5:37 AM    Specimen: Blood, Venous   Result Value Ref Range    Tacrolimus 5.2 5.0 - 20.0 ng/mL   Basic Metabolic Panel    Collection Time: 09/01/24  5:37 AM    Specimen: Blood, Venous   Result Value Ref Range    Fasting Status      Sodium 136 135 - 145 mmol/L    Potassium 4.4 3.4 - 5.1 mmol/L    Chloride 103 97 - 110 mmol/L    Carbon Dioxide 27 21 - 32 mmol/L    Anion Gap 10 7 - 19 mmol/L    Glucose 130 (H) 70 - 99 mg/dL     (H) 6 - 20 mg/dL    Creatinine 1.29 (H) 0.67 - 1.17 mg/dL    Glomerular Filtration Rate 65 >=60    BUN/Cr 99 (H) 7 - 25    Calcium 9.1 8.4 - 10.2 mg/dL   CBC No Differential    Collection Time: 09/01/24  5:37 AM    Specimen: Blood, Venous   Result Value Ref Range    WBC 21.1 (H) 4.2 - 11.0 K/mcL    RBC 2.55 (L) 4.50 - 5.90 mil/mcL    HGB 7.7 (L) 13.0 - 17.0 g/dL    HCT 24.3 (L) 39.0 - 51.0 %    MCV 95.3 78.0 - 100.0 fl    MCH 30.2 26.0 - 34.0 pg    MCHC 31.7 (L) 32.0 - 36.5 g/dL     140 - 450 K/mcL    RDW-CV 17.2 (H) 11.0 - 15.0 %    RDW-SD 59.7 (H) 39.0 - 50.0 fL    NRBC 0 <=0 /100 WBC   GLUCOSE, BEDSIDE - POINT OF CARE    Collection Time: 09/01/24 11:35 AM    Specimen: Blood, Capillary   Result Value Ref Range    GLUCOSE, BEDSIDE - POINT OF CARE 137 (H) 70 - 99 mg/dL   GLUCOSE, BEDSIDE - POINT OF CARE    Collection Time: 09/01/24  5:49 PM    Specimen: Blood, Capillary   Result Value Ref Range    GLUCOSE, BEDSIDE - POINT OF CARE 134 (H) 70 - 99 mg/dL       UA  Lab Results   Component Value Date    UWBC Large (A) 08/14/2024    UWBC NEGATIVE 07/14/2019    URBC Large (A) 08/14/2024    URBC NEGATIVE 07/14/2019        IMAGING  No results found.         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 Chest xray showed LLL pneumonia. He was transferred to [facility]. Patient on Merrem and now off levophed. CT femur shows concern for stump infection, ID and ortho , podiatry consulted     Sepsis  due to  pneumonia  -H/o recurrent LLL pneumonia  -Complete Lt lung atelectasis s/p emergency bronchoscopy (7/23/24): LLL mucus plugging: BAL CS PSAr   -Pulm and ID following  -Rreated with Zerbaxa.     Chronic respiratory failure  Trach and Vent dependent   -Pulmonology following  -Pulmonology following-continue vent management per pulm    Hx of PE  -On ELiquis    Acute metabolic encephalopathy  - Resolved  - A&O x 4 on 8/3     Peripheral artery disease s/p right AKA (2/28/24)  Right AKA large open wound with exposed femur   -S/p I&D and revision 4/5  -S/p revision of right AKA 5/21 and again 8/30  wound care  -CT femur 7/20: Distal right femur amputation. Large soft tissue ulceration overlying the stump with soft tissue gas extending to the bone surface, concerning for open wound and/or soft tissue infection. Diffuse soft tissue swelling in the thigh. No discrete osseous erosion or abscess identified. Likely occlusion of the native and grafted right superficial femoral arteries.Right transplant kidney.  -Ortho was considering hip disarticulation but concern for pt's healing ability  -Multiple physicians contacted at [facility], none able to perform disarticulation, therefore transfer requests sent to [facility] and [facility].  Per [facility] no need for inpatient transfer  -Dr. Mckeon consulted for second opinion;  discussed with pt and wife and decided on   R AKA revision  -S/p revision of R AKA 8/30     Amputation  of right second-fourth digits  Gangrenous digits in L hand - (fingers 1,2, 4 and 5)  -US upper extremity segmental pressures on the left - slightly diminished segmental pressures at hte left wrist radial and ulnar arteries compared to the left brachial artery, no wave forms of doppler flow detected in the digits of the left hand, suggesting severe stenosis or occlusion distal to the wrist   -Vascular consulted and consult noted, no surgical intervention     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 Skin changes and pressure injuries might be inevitable and unavoidable, and may continue to worsen, despite proper preventive and treatment measures. Current wound care objective is to concentrate on infection prevention as well as pain, odor, and drainage control. strength Dakin solution WTD every 12 hours and as needed   Left lateral thigh and fibular head areas with unstageable pressure injuries-POA  Paint/clean with iodine and cover with bordered Mepilex foam daily   Left anterior ankle, heel, posterior ankle, and posterior calf with ischemic necrotic wounds  Paint/clean with iodine and wrapped with Kerlix-Daily  Heel offloading boot     FRANK   Hypovolemic hyponatremia  ESRD s/p renal transplant  -Immunosuppressed/Chronic steroid use  -s/p IVF  -HD per renal  -Monitor renal function  -Nephrology on board.       Acute on chronic anemia  -Come bleeding from stump  -Monitor H&H  -Transfuse to hgb > 7     Paroxysmal A. Fib  -Monitor on telemetry  -On  Eliquis; ok to resume 9/2 after it was held post op     Hx of seizures  -On vimpat     BPH  -Flomax held      Diabetes mellitus with hypoglycemia.  -Lantus 6 units daily, 2 units q6 and ssi   -will monitor on accucheks       Severe protein calorie  malnutrition  Dysphagia  PEG tube feeding  -cont TF    Diarrhea  -Pt has had a rectal tube fore weeks  -C diff pcr pos only; on vanc  -On banana flakes.   -Consider cholestyramine    RLE swelling 9/1  -Will check venous US    DVT prophylaxis:SCD,  Eliquis    Diet: TF    Code Status: Full    Disposition: Inpatient      Had an extended conversation with pt and his wife evening  8/29  I have discussed his status and updated his wife. She states she is realistic about his status and prognosis; she hopes that doing the revised AKA may help with the stump wound and hopes it decreases likely villalobos of infections but knows it may  not and taht he infact may have recurrent infection.. She states she has recently seen how extensive his sacral wound is,  and is aware that the prognosis for healing likely poor due to his overall debiliated state and immunosuppressive meds. She states she will always respect his wishes. Pt confirms that he wants to undergo the surgery tomorrow.     PCP: Ranjeet Kerr      Time spent >50 minutes and greater than 50% of the time was spent reviewing the patient records, coordinating patient care plan and discussing the above care plan  with the patient, nurse and consultants.      Josie Mayberry MD               Never

## 2024-11-04 NOTE — ED ADULT NURSE REASSESSMENT NOTE - NSFALLUNIVINTERV_ED_ALL_ED
EGD PROCEDURE NOTE    PATIENT NAME: Etelvina Fair  :1969   MRN:[id number]   PCP: Berta Tinsley MD    PROCEDURE: EGD  DATE OF SERVICE: 2024  PERFORMING PROVIDER: Junaid Quach MD  ASSISTANT: None  ASSISTANT TASKS: None  =============================================================     PREOPERATIVE DIAGNOSIS:    55 year old year old female who presents for EGD for.  Diarrhea, unspecified type [R19.7]  Anemia, unspecified type [D64.9]      POSTOPERATIVE DIAGNOSIS:   See IMPRESSIONS    ANESTHESIA TYPE:   Monitored Anesthesia Care    PROCEDURE MEDICATIONS:   Administrations occurring from 0800 to 0830 on 24:  * No intraprocedure medications in log *     DESCRIPTION OF PROCEDURE:   Consent was obtained from the patient for the procedure. The nature, benefits, possible complications, and alternatives were explained in detail. The patient voiced understanding and agreed to proceed with the examination.  Electrocardiogram, pulse, pulse oximetry, and blood pressure were continuously monitored. The patient was turned to the left lateral position.  A verbal time out was done and patient was sedated to a comfortable level.    Bite block was placed and oxygen administered by a nasal cannula as needed. Medications were administered incrementally over the course of the procedure to achieve an adequate level of sedation. After adequate sedation, the endoscope was carefully introduced into the oropharynx and passed into the esophagus. The esophagus and GE junction were carefully examined. After advancement of the endoscope into the stomach, a careful examination was performed, including views of the antrum, incisura angularis, corpus and retroflexed views of the cardia and fundus The pylorus was then intubated without any difficulty and the endoscope was advanced to the second portion of the duodenum. Careful examination of the second portion of the duodenum and the bulb was then performed. Findings and 
intervention are detailed below. The stomach was then decompressed and the endoscope withdrawn.  Dr Quach was present for the entire procedure.    COMPLICATIONS: None.     BLOOD LOSS: < 5ML    IMPLANTS: * No implants in log *    =============================================================    FINDINGS:   There was a 4 cm hiatal hernia.  There was mild erythema in the gastric antrum.  Biopsies were taken for evaluation. The remainder of the exam was normal.  Biopsies were taken from the duodenum to evaluate for a microscopic cause of patient's symptoms.        SPECIMEN:   ID Type Source Tests Collected by Time   A : antrum bx Tissue Stomach SURGICAL PATHOLOGY Junaid Quach MD 11/4/2024 0809   B : duodenal bx Tissue Small Intestine SURGICAL PATHOLOGY Junaid Quach MD 11/4/2024 0809            ==============================================================    IMPRESSIONS:  Hiatal hernia  Antral gastritis.        RECOMMENDATIONS:   Follow up results of biopsy specimens.   Further recommendations after results of biopsies.          Junaid Quach M.D.  [facility]  Physician Profile      
Bed/Stretcher in lowest position, wheels locked, appropriate side rails in place/Call bell, personal items and telephone in reach/Instruct patient to call for assistance before getting out of bed/chair/stretcher/Non-slip footwear applied when patient is off stretcher/[place] to call system/Physically safe environment - no spills, clutter or unnecessary equipment/Purposeful proactive rounding/Room/bathroom lighting operational, light cord in reach
Bed/Stretcher in lowest position, wheels locked, appropriate side rails in place/Call bell, personal items and telephone in reach/Instruct patient to call for assistance before getting out of bed/chair/stretcher/Non-slip footwear applied when patient is off stretcher/[place] to call system/Physically safe environment - no spills, clutter or unnecessary equipment/Purposeful proactive rounding/Room/bathroom lighting operational, light cord in reach

## 2025-03-20 NOTE — DISCHARGE NOTE NURSING/CASE MANAGEMENT/SOCIAL WORK - NSFLUVACAGEDISCH_IMM_ALL_CORE
- Continue with good blood pressure control; I would recommend monitoring at home at least 3 times per week; Goal of <130/80  - Continue with good cholesterol control; Goal LDL <70  - Continue with good blood sugar control; Goal HgbA1c <7.0  - Will defer monitoring of cholesterol and blood sugar and management of hypertensive medications to the primary care provider  - Stay well hydrated by drinking enough water   - Will consider repeat MRI Brain to evaluate for micro hemorrhages in March 2026  - Eat a healthy diet, high in lean meats fish, turkey, chicken. Low in fats, cholesterol, sugars and sodium. Avoid canned foods,  get lets of fresh/frozen vegetables/fruits.  - Get routine exercise/physical activity as much as able to tolerate  - Keep follow ups with your other health care providers  - Follow up as scheduled with Neurosurgery for ICA aneurysms   - Continue Aspirin 81 mg daily and Lipitor 40 mg daily.  - Fall precautions     I will plan for her to return to the office in 6-8 months time but would be happy to see her sooner if the need should arise.  If she has any symptoms concerning for TIA or stroke including sudden painless loss of vision or double vision, difficulty speaking or swallowing, vertigo/room spinning that does not quickly resolve, or weakness/numbness affecting 1 side of the face or body she should proceed by ambulance to the nearest emergency room immediately.  
Adult